# Patient Record
Sex: MALE | Race: WHITE | NOT HISPANIC OR LATINO | Employment: UNEMPLOYED | URBAN - METROPOLITAN AREA
[De-identification: names, ages, dates, MRNs, and addresses within clinical notes are randomized per-mention and may not be internally consistent; named-entity substitution may affect disease eponyms.]

---

## 2018-11-21 ENCOUNTER — LAB REQUISITION (OUTPATIENT)
Dept: LAB | Facility: HOSPITAL | Age: 38
End: 2018-11-21
Payer: COMMERCIAL

## 2018-11-21 DIAGNOSIS — Z79.899 OTHER LONG TERM (CURRENT) DRUG THERAPY: ICD-10-CM

## 2018-11-21 LAB
25(OH)D3 SERPL-MCNC: 11.2 NG/ML (ref 30–100)
ALBUMIN SERPL BCP-MCNC: 4.4 G/DL (ref 3.5–5)
ALP SERPL-CCNC: 80 U/L (ref 46–116)
ALT SERPL W P-5'-P-CCNC: 31 U/L (ref 12–78)
ANION GAP SERPL CALCULATED.3IONS-SCNC: 6 MMOL/L (ref 4–13)
AST SERPL W P-5'-P-CCNC: 13 U/L (ref 5–45)
BASOPHILS # BLD AUTO: 0.03 THOUSANDS/ΜL (ref 0–0.1)
BASOPHILS NFR BLD AUTO: 1 % (ref 0–1)
BILIRUB SERPL-MCNC: 0.58 MG/DL (ref 0.2–1)
BUN SERPL-MCNC: 14 MG/DL (ref 5–25)
CALCIUM SERPL-MCNC: 8.8 MG/DL (ref 8.3–10.1)
CHLORIDE SERPL-SCNC: 103 MMOL/L (ref 100–108)
CO2 SERPL-SCNC: 27 MMOL/L (ref 21–32)
CREAT SERPL-MCNC: 0.91 MG/DL (ref 0.6–1.3)
EOSINOPHIL # BLD AUTO: 0.04 THOUSAND/ΜL (ref 0–0.61)
EOSINOPHIL NFR BLD AUTO: 1 % (ref 0–6)
ERYTHROCYTE [DISTWIDTH] IN BLOOD BY AUTOMATED COUNT: 12.4 % (ref 11.6–15.1)
GFR SERPL CREATININE-BSD FRML MDRD: 81 ML/MIN/1.73SQ M
GLUCOSE SERPL-MCNC: 86 MG/DL (ref 65–140)
HCT VFR BLD AUTO: 49.6 % (ref 34.8–46.1)
HGB BLD-MCNC: 16.2 G/DL (ref 11.5–15.4)
IMM GRANULOCYTES # BLD AUTO: 0.04 THOUSAND/UL (ref 0–0.2)
IMM GRANULOCYTES NFR BLD AUTO: 1 % (ref 0–2)
LYMPHOCYTES # BLD AUTO: 1.55 THOUSANDS/ΜL (ref 0.6–4.47)
LYMPHOCYTES NFR BLD AUTO: 27 % (ref 14–44)
MCH RBC QN AUTO: 28.7 PG (ref 26.8–34.3)
MCHC RBC AUTO-ENTMCNC: 32.7 G/DL (ref 31.4–37.4)
MCV RBC AUTO: 88 FL (ref 82–98)
MONOCYTES # BLD AUTO: 0.5 THOUSAND/ΜL (ref 0.17–1.22)
MONOCYTES NFR BLD AUTO: 9 % (ref 4–12)
NEUTROPHILS # BLD AUTO: 3.61 THOUSANDS/ΜL (ref 1.85–7.62)
NEUTS SEG NFR BLD AUTO: 61 % (ref 43–75)
NRBC BLD AUTO-RTO: 0 /100 WBCS
PLATELET # BLD AUTO: 200 THOUSANDS/UL (ref 149–390)
PMV BLD AUTO: 9.5 FL (ref 8.9–12.7)
POTASSIUM SERPL-SCNC: 4.3 MMOL/L (ref 3.5–5.3)
PROT SERPL-MCNC: 7.5 G/DL (ref 6.4–8.2)
RBC # BLD AUTO: 5.65 MILLION/UL (ref 3.81–5.12)
SODIUM SERPL-SCNC: 136 MMOL/L (ref 136–145)
TSH SERPL DL<=0.05 MIU/L-ACNC: 2.57 UIU/ML (ref 0.36–3.74)
VALPROATE SERPL-MCNC: 68 UG/ML (ref 50–100)
WBC # BLD AUTO: 5.77 THOUSAND/UL (ref 4.31–10.16)

## 2018-11-21 PROCEDURE — 85025 COMPLETE CBC W/AUTO DIFF WBC: CPT | Performed by: PSYCHIATRY & NEUROLOGY

## 2018-11-21 PROCEDURE — 80164 ASSAY DIPROPYLACETIC ACD TOT: CPT | Performed by: PSYCHIATRY & NEUROLOGY

## 2018-11-21 PROCEDURE — 84443 ASSAY THYROID STIM HORMONE: CPT | Performed by: PSYCHIATRY & NEUROLOGY

## 2018-11-21 PROCEDURE — 80053 COMPREHEN METABOLIC PANEL: CPT | Performed by: PSYCHIATRY & NEUROLOGY

## 2018-11-21 PROCEDURE — 82306 VITAMIN D 25 HYDROXY: CPT | Performed by: PSYCHIATRY & NEUROLOGY

## 2019-01-15 ENCOUNTER — TRANSCRIBE ORDERS (OUTPATIENT)
Dept: LAB | Facility: CLINIC | Age: 39
End: 2019-01-15

## 2019-01-15 ENCOUNTER — APPOINTMENT (OUTPATIENT)
Dept: LAB | Facility: CLINIC | Age: 39
End: 2019-01-15
Payer: COMMERCIAL

## 2019-01-15 DIAGNOSIS — G40.802 CURSIVE EPILEPSY (HCC): ICD-10-CM

## 2019-01-15 DIAGNOSIS — Z00.00 ROUTINE GENERAL MEDICAL EXAMINATION AT A HEALTH CARE FACILITY: ICD-10-CM

## 2019-01-15 DIAGNOSIS — Z79.890 NEED FOR PROPHYLACTIC HORMONE REPLACEMENT THERAPY (POSTMENOPAUSAL): ICD-10-CM

## 2019-01-15 DIAGNOSIS — G40.802 CURSIVE EPILEPSY (HCC): Primary | ICD-10-CM

## 2019-01-15 LAB
ALBUMIN SERPL BCP-MCNC: 4 G/DL (ref 3.5–5)
ALP SERPL-CCNC: 72 U/L (ref 46–116)
ALT SERPL W P-5'-P-CCNC: 22 U/L (ref 12–78)
ANION GAP SERPL CALCULATED.3IONS-SCNC: 6 MMOL/L (ref 4–13)
AST SERPL W P-5'-P-CCNC: 10 U/L (ref 5–45)
BILIRUB SERPL-MCNC: 0.47 MG/DL (ref 0.2–1)
BUN SERPL-MCNC: 14 MG/DL (ref 5–25)
CALCIUM SERPL-MCNC: 8.9 MG/DL (ref 8.3–10.1)
CHLORIDE SERPL-SCNC: 103 MMOL/L (ref 100–108)
CHOLEST SERPL-MCNC: 155 MG/DL (ref 50–200)
CO2 SERPL-SCNC: 29 MMOL/L (ref 21–32)
CREAT SERPL-MCNC: 0.84 MG/DL (ref 0.6–1.3)
ERYTHROCYTE [DISTWIDTH] IN BLOOD BY AUTOMATED COUNT: 12 % (ref 11.6–15.1)
GFR SERPL CREATININE-BSD FRML MDRD: 112 ML/MIN/1.73SQ M
GLUCOSE P FAST SERPL-MCNC: 100 MG/DL (ref 65–99)
HCT VFR BLD AUTO: 45.3 % (ref 36.5–49.3)
HDLC SERPL-MCNC: 44 MG/DL (ref 40–60)
HGB BLD-MCNC: 15.2 G/DL (ref 12–17)
LDLC SERPL CALC-MCNC: 88 MG/DL (ref 0–100)
MCH RBC QN AUTO: 28.7 PG (ref 26.8–34.3)
MCHC RBC AUTO-ENTMCNC: 33.6 G/DL (ref 31.4–37.4)
MCV RBC AUTO: 86 FL (ref 82–98)
NONHDLC SERPL-MCNC: 111 MG/DL
PLATELET # BLD AUTO: 178 THOUSANDS/UL (ref 149–390)
PMV BLD AUTO: 9 FL (ref 8.9–12.7)
POTASSIUM SERPL-SCNC: 4.2 MMOL/L (ref 3.5–5.3)
PROT SERPL-MCNC: 7 G/DL (ref 6.4–8.2)
RBC # BLD AUTO: 5.29 MILLION/UL (ref 3.88–5.62)
SODIUM SERPL-SCNC: 138 MMOL/L (ref 136–145)
TRIGL SERPL-MCNC: 116 MG/DL
WBC # BLD AUTO: 4.55 THOUSAND/UL (ref 4.31–10.16)

## 2019-01-15 PROCEDURE — 80053 COMPREHEN METABOLIC PANEL: CPT

## 2019-01-15 PROCEDURE — 85027 COMPLETE CBC AUTOMATED: CPT

## 2019-01-15 PROCEDURE — 36415 COLL VENOUS BLD VENIPUNCTURE: CPT

## 2019-01-15 PROCEDURE — 80061 LIPID PANEL: CPT

## 2019-03-08 ENCOUNTER — APPOINTMENT (OUTPATIENT)
Dept: RADIOLOGY | Facility: CLINIC | Age: 39
End: 2019-03-08
Payer: COMMERCIAL

## 2019-03-08 ENCOUNTER — TRANSCRIBE ORDERS (OUTPATIENT)
Dept: URGENT CARE | Facility: CLINIC | Age: 39
End: 2019-03-08

## 2019-03-08 DIAGNOSIS — M54.5 LOW BACK PAIN, UNSPECIFIED BACK PAIN LATERALITY, UNSPECIFIED CHRONICITY, WITH SCIATICA PRESENCE UNSPECIFIED: Primary | ICD-10-CM

## 2019-03-08 PROCEDURE — 72110 X-RAY EXAM L-2 SPINE 4/>VWS: CPT

## 2019-08-26 ENCOUNTER — TRANSCRIBE ORDERS (OUTPATIENT)
Dept: ADMINISTRATIVE | Facility: HOSPITAL | Age: 39
End: 2019-08-26

## 2019-08-26 DIAGNOSIS — R56.9 SEIZURES (HCC): Primary | ICD-10-CM

## 2019-09-10 ENCOUNTER — HOSPITAL ENCOUNTER (OUTPATIENT)
Dept: NEUROLOGY | Facility: HOSPITAL | Age: 39
Discharge: HOME | End: 2019-09-10
Payer: COMMERCIAL

## 2019-09-10 DIAGNOSIS — R56.9 SEIZURES (HCC): ICD-10-CM

## 2019-09-10 PROCEDURE — 95816 EEG AWAKE AND DROWSY: CPT

## 2019-09-10 PROCEDURE — 95816 EEG AWAKE AND DROWSY: CPT | Performed by: PSYCHIATRY & NEUROLOGY

## 2019-09-30 ENCOUNTER — TRANSCRIBE ORDERS (OUTPATIENT)
Dept: LAB | Facility: CLINIC | Age: 39
End: 2019-09-30

## 2019-09-30 ENCOUNTER — APPOINTMENT (OUTPATIENT)
Dept: LAB | Facility: CLINIC | Age: 39
End: 2019-09-30
Payer: COMMERCIAL

## 2019-09-30 DIAGNOSIS — R56.9 SEIZURES (HCC): Primary | ICD-10-CM

## 2019-09-30 DIAGNOSIS — R56.9 SEIZURES (HCC): ICD-10-CM

## 2019-09-30 LAB
ALBUMIN SERPL BCP-MCNC: 4 G/DL (ref 3.5–5)
ALP SERPL-CCNC: 77 U/L (ref 46–116)
ALT SERPL W P-5'-P-CCNC: 23 U/L (ref 12–78)
ANION GAP SERPL CALCULATED.3IONS-SCNC: 7 MMOL/L (ref 4–13)
AST SERPL W P-5'-P-CCNC: 10 U/L (ref 5–45)
BILIRUB SERPL-MCNC: 0.63 MG/DL (ref 0.2–1)
BUN SERPL-MCNC: 12 MG/DL (ref 5–25)
CALCIUM SERPL-MCNC: 9 MG/DL (ref 8.3–10.1)
CHLORIDE SERPL-SCNC: 105 MMOL/L (ref 100–108)
CO2 SERPL-SCNC: 27 MMOL/L (ref 21–32)
CREAT SERPL-MCNC: 1 MG/DL (ref 0.6–1.3)
ERYTHROCYTE [DISTWIDTH] IN BLOOD BY AUTOMATED COUNT: 12 % (ref 11.6–15.1)
GFR SERPL CREATININE-BSD FRML MDRD: 95 ML/MIN/1.73SQ M
GLUCOSE P FAST SERPL-MCNC: 97 MG/DL (ref 65–99)
HCT VFR BLD AUTO: 45.6 % (ref 36.5–49.3)
HGB BLD-MCNC: 15.7 G/DL (ref 12–17)
MCH RBC QN AUTO: 29 PG (ref 26.8–34.3)
MCHC RBC AUTO-ENTMCNC: 34.4 G/DL (ref 31.4–37.4)
MCV RBC AUTO: 84 FL (ref 82–98)
PLATELET # BLD AUTO: 180 THOUSANDS/UL (ref 149–390)
PMV BLD AUTO: 9.4 FL (ref 8.9–12.7)
POTASSIUM SERPL-SCNC: 4.2 MMOL/L (ref 3.5–5.3)
PROT SERPL-MCNC: 7.2 G/DL (ref 6.4–8.2)
RBC # BLD AUTO: 5.41 MILLION/UL (ref 3.88–5.62)
SODIUM SERPL-SCNC: 139 MMOL/L (ref 136–145)
VALPROATE SERPL-MCNC: 72 UG/ML (ref 50–100)
WBC # BLD AUTO: 4.16 THOUSAND/UL (ref 4.31–10.16)

## 2019-09-30 PROCEDURE — 80164 ASSAY DIPROPYLACETIC ACD TOT: CPT

## 2019-09-30 PROCEDURE — 80053 COMPREHEN METABOLIC PANEL: CPT

## 2019-09-30 PROCEDURE — 36415 COLL VENOUS BLD VENIPUNCTURE: CPT

## 2019-09-30 PROCEDURE — 85027 COMPLETE CBC AUTOMATED: CPT

## 2019-10-03 ENCOUNTER — TRANSCRIBE ORDERS (OUTPATIENT)
Dept: ADMINISTRATIVE | Facility: HOSPITAL | Age: 39
End: 2019-10-03

## 2019-10-03 DIAGNOSIS — G89.29 CHRONIC INTRACTABLE HEADACHE, UNSPECIFIED HEADACHE TYPE: ICD-10-CM

## 2019-10-03 DIAGNOSIS — G40.309 GENERALIZED IDIOPATHIC EPILEPSY AND EPILEPTIC SYNDROMES, WITHOUT STATUS EPILEPTICUS, NOT INTRACTABLE (HCC): ICD-10-CM

## 2019-10-03 DIAGNOSIS — R51.9 CHRONIC INTRACTABLE HEADACHE, UNSPECIFIED HEADACHE TYPE: ICD-10-CM

## 2019-10-03 DIAGNOSIS — G43.009 MIGRAINE WITHOUT AURA AND WITHOUT STATUS MIGRAINOSUS, NOT INTRACTABLE: Primary | ICD-10-CM

## 2019-10-09 ENCOUNTER — HOSPITAL ENCOUNTER (OUTPATIENT)
Dept: MRI IMAGING | Facility: HOSPITAL | Age: 39
Discharge: HOME | End: 2019-10-09

## 2019-10-09 DIAGNOSIS — R51.9 CHRONIC INTRACTABLE HEADACHE, UNSPECIFIED HEADACHE TYPE: ICD-10-CM

## 2019-10-09 DIAGNOSIS — G89.29 CHRONIC INTRACTABLE HEADACHE, UNSPECIFIED HEADACHE TYPE: ICD-10-CM

## 2019-10-09 DIAGNOSIS — G40.309 GENERALIZED IDIOPATHIC EPILEPSY AND EPILEPTIC SYNDROMES, WITHOUT STATUS EPILEPTICUS, NOT INTRACTABLE (HCC): ICD-10-CM

## 2019-10-09 DIAGNOSIS — G43.009 MIGRAINE WITHOUT AURA AND WITHOUT STATUS MIGRAINOSUS, NOT INTRACTABLE: ICD-10-CM

## 2020-02-16 ENCOUNTER — OFFICE VISIT (OUTPATIENT)
Dept: URGENT CARE | Facility: CLINIC | Age: 40
End: 2020-02-16
Payer: COMMERCIAL

## 2020-02-16 VITALS
SYSTOLIC BLOOD PRESSURE: 138 MMHG | OXYGEN SATURATION: 99 % | RESPIRATION RATE: 18 BRPM | TEMPERATURE: 97.6 F | HEART RATE: 104 BPM | WEIGHT: 223 LBS | DIASTOLIC BLOOD PRESSURE: 82 MMHG | HEIGHT: 67 IN | BODY MASS INDEX: 35 KG/M2

## 2020-02-16 DIAGNOSIS — H61.21 IMPACTED CERUMEN OF RIGHT EAR: ICD-10-CM

## 2020-02-16 DIAGNOSIS — H61.22 EXCESSIVE CERUMEN IN EAR CANAL, LEFT: ICD-10-CM

## 2020-02-16 DIAGNOSIS — J02.9 SORE THROAT: ICD-10-CM

## 2020-02-16 DIAGNOSIS — J02.9 ACUTE VIRAL PHARYNGITIS: Primary | ICD-10-CM

## 2020-02-16 LAB — S PYO AG THROAT QL: NEGATIVE

## 2020-02-16 PROCEDURE — 69209 REMOVE IMPACTED EAR WAX UNI: CPT | Performed by: NURSE PRACTITIONER

## 2020-02-16 PROCEDURE — S9088 SERVICES PROVIDED IN URGENT: HCPCS | Performed by: NURSE PRACTITIONER

## 2020-02-16 PROCEDURE — 87070 CULTURE OTHR SPECIMN AEROBIC: CPT | Performed by: NURSE PRACTITIONER

## 2020-02-16 PROCEDURE — 87880 STREP A ASSAY W/OPTIC: CPT | Performed by: NURSE PRACTITIONER

## 2020-02-16 PROCEDURE — 99203 OFFICE O/P NEW LOW 30 MIN: CPT | Performed by: NURSE PRACTITIONER

## 2020-02-16 RX ORDER — DIVALPROEX SODIUM 500 MG/1
500 TABLET, EXTENDED RELEASE ORAL DAILY
COMMUNITY

## 2020-02-16 RX ORDER — DIVALPROEX SODIUM 250 MG/1
TABLET, EXTENDED RELEASE ORAL
COMMUNITY
Start: 2019-11-15

## 2020-02-17 NOTE — PROGRESS NOTES
[address] [facility]        NAME: Codi Hobbs is a 46 y o  male  : 1980    MRN: [id number]  DATE: 2020  TIME: 9:06 PM    Assessment and Plan   Acute viral pharyngitis [J02 8, B97 89]  1  Acute viral pharyngitis  Throat culture   2  Sore throat  POCT rapid strepA   3  Excessive cerumen in ear canal, left  Ear cerumen removal   4  Impacted cerumen of right ear  Ear cerumen removal     Ear cerumen removal  Date/Time: 2020 9:05 PM  Performed by: MAY Paul  Authorized by: Panchito Paul [facility]     Patient location:  Clinic  Consent:     Consent obtained:  Verbal    Consent given by:  Patient    Risks discussed:  Bleeding, dizziness, infection, incomplete removal, pain and TM perforation    Alternatives discussed:  Referral, observation, alternative treatment, delayed treatment and no treatment  Universal protocol:     Procedure explained and questions answered to patient or proxy's satisfaction: yes      Patient identity confirmed:  Verbally with patient  Procedure details:     Location:  L ear and R ear    Procedure type: irrigation only    Post-procedure details:     Complication:  None    Hearing quality:  Improved    Patient tolerance of procedure: Tolerated well, no immediate complications        Patient Instructions     Patient Instructions     Rapid strep was negative  We will send the other swab for culture and will call you if it is positive  Use over the counter medications to treat symptoms (ibuprofen, tylenol, throat lozenges)  Drinking warm tea with honey and/or gargling salt water will help soothe your throat  All of the excess ear wax was removed  Throat pain can sometimes cause ear discomfort too, but your ears are not infected  Since you tend to have wax build up, I recommend using generic Debrox occasionally  Pharyngitis   AMBULATORY CARE:   Pharyngitis , or sore throat, is inflammation of the tissues and structures in your pharynx (throat)  Pharyngitis is most often caused by bacteria  It may also be caused by a cold or flu virus  Other causes include smoking, allergies, or acid reflux  Signs and symptoms that may occur with pharyngitis:   · Sore throat or pain when you swallow    · Fever, chills, and body aches    · Hoarse or raspy voice    · Cough, runny or stuffy nose, itchy or watery eyes    · Headache    · Upset stomach and loss of appetite    · Mild neck stiffness    · Swollen glands that feel like hard lumps when you touch your neck    · White and yellow pus-filled blisters in the back of your throat  Call 911 for any of the following:   · You have trouble breathing or swallowing because your throat is swollen or sore  Seek care immediately if:   · You are drooling because it hurts too much to swallow  · Your fever is higher than 102? F (39?C) or lasts longer than 3 days  · You are confused  · You taste blood in your throat  Contact your healthcare provider if:   · Your throat pain gets worse  · You have a painful lump in your throat that does not go away after 5 days  · Your symptoms do not improve after 5 days  · You have questions or concerns about your condition or care  Treatment for pharyngitis:  Viral pharyngitis will go away on its own without treatment  Your sore throat should start to feel better in 3 to 5 days for both viral and bacterial infections  You may need any of the following:  · Antibiotics  treat a bacterial infection  · NSAIDs , such as ibuprofen, help decrease swelling, pain, and fever  NSAIDs can cause stomach bleeding or kidney problems in certain people  If you take blood thinner medicine, always ask your healthcare provider if NSAIDs are safe for you  Always read the medicine label and follow directions  · Acetaminophen  decreases pain and fever  It is available without a doctor's order  Ask how much to take and how often to take it  Follow directions   Acetaminophen can cause liver damage if not taken correctly  Manage your symptoms:   · Gargle salt water  Mix ¼ teaspoon salt in an 8 ounce glass of warm water and gargle  This may help decrease swelling in your throat  · Drink liquids as directed  You may need to drink more liquids than usual  Liquids may help soothe your throat and prevent dehydration  Ask how much liquid to drink each day and which liquids are best for you  · Use a cool-steam humidifier  to help moisten the air in your room and calm your cough  · Soothe your throat  with cough drops, ice, soft foods, or popsicles  Prevent the spread of pharyngitis:  Cover your mouth and nose when you cough or sneeze  Do not share food or drinks  Wash your hands often  Use soap and water  If soap and water are unavailable, use an alcohol based hand   Follow up with your healthcare provider as directed:  Write down your questions so you remember to ask them during your visits  © 2017 [address] [facility] Information is for End User's use only and may not be sold, redistributed or otherwise used for commercial purposes  All illustrations and images included in CareNotes® are the copyrighted property of A D A M [employer]  or Mahad Trejo  The above information is an  only  It is not intended as medical advice for individual conditions or treatments  Talk to your doctor, nurse or pharmacist before following any medical regimen to see if it is safe and effective for you  Cerumen Impaction   WHAT YOU NEED TO KNOW:   What is cerumen impaction? Cerumen impaction is the blockage of the outer ear canal by tightly packed cerumen (earwax)  What causes cerumen impaction? Anything that affects the normal outward flow of cerumen may cause impaction   The following factors may make it more likely for earwax to become impacted:  · Advanced age     · Conditions that produce too much cerumen, such as keratosis and other skin diseases    · Narrow or abnormally shaped ear canals    · Use of a hearing aid    · Incorrect use of cotton swabs, or using needles, hair pins, or other objects to clean the ears  What are the signs and symptoms of cerumen impaction? · Trouble hearing    · Dizziness    · Ear fullness or a feeling that something is plugging up your ear    · Itchiness or pain in the ears    · Ringing in the ears  How is cerumen impaction diagnosed? Your healthcare provider will ask about your medical history  This includes any ear problems or procedures you may have had  Your healthcare provider will carefully check your ears using a scope with a light  Your healthcare provider may look for other problems, such as bleeding, infection, or injury  Your eardrums will be checked for tears or holes  Your healthcare provider may also test your hearing  How is cerumen impaction treated? The goal of treatment is to remove the hardened wax  The type of treatment to be used may depend on your age, symptoms, or risk factors  Ask your healthcare provider which of the following treatments may be best for you:  · Ear drops:  Ear drops may be used to clear or soften the impacted earwax  This may be used alone or in combination with a procedure to take out the earwax  · Procedures:  When the impaction can be clearly seen, removal may be done using any of the following:    ¨ Irrigation:  Warm water from a syringe is used to wash the wax out of the ear canal  Irrigation may not be used on people with an eardrum tear, infection, or who have had ear surgery  ¨ Suction:  A small plastic tube that is connected to a machine is used to suck the impacted wax out of your ear  ¨ Instruments:  Your healthcare provider may use a curette (scoop-like instrument) or forceps to remove the impacted wax  What are the risks of having a cerumen impaction? · Procedures to remove the wax may cause bleeding and infection   The ear canal may be scraped and scratched or the eardrum may be injured, which may cause loss of hearing  · Untreated impacted cerumen may cause your symptoms to become worse  If it is not removed, impacted cerumen may cause an infection, irritation, loss of hearing, or further ear problems  When should I contact my healthcare provider? · You have a fever  · You have trouble hearing or hear ringing noises  · You have questions or concerns about your condition or care  When should I seek immediate care? · You feel dizzy  · You have discharge or blood coming out of your ear  · Your ear pain does not go away or gets worse  CARE AGREEMENT:   You have the right to help plan your care  Learn about your health condition and how it may be treated  Discuss treatment options with your caregivers to decide what care you want to receive  You always have the right to refuse treatment  The above information is an  only  It is not intended as medical advice for individual conditions or treatments  Talk to your doctor, nurse or pharmacist before following any medical regimen to see if it is safe and effective for you  © 2017 [address] [facility] Information is for End User's use only and may not be sold, redistributed or otherwise used for commercial purposes  All illustrations and images included in CareNotes® are the copyrighted property of A D A M [employer] Inc  or [employer]adalex Trejo  Follow up with PCP in 3-5 days  Proceed to  ER if symptoms worsen  Chief Complaint     Chief Complaint   Patient presents with    Sore Throat     for 1 day         History of Present Illness       Patient reports a sore throat since Friday night that he feels is getting worse not better  He also reports comfort in both ears and states that he thinks he may have excess wax since he has a history of wax buildup and eating have his ears flushed  He denies any congestion or postnasal drip  He presents to be seen    No known sick contacts      Review of Systems   Review of Systems   HENT: Positive for ear pain and sore throat  Negative for congestion and ear discharge  Respiratory: Negative  All other systems reviewed and are negative  Current Medications       Current Outpatient Medications:     divalproex sodium (DEPAKOTE ER) 250 mg 24 hr tablet, , Disp: , Rfl:     divalproex sodium (DEPAKOTE ER) 500 mg 24 hr tablet, Take 500 mg by mouth daily, Disp: , Rfl:     Current Allergies     Allergies as of 02/16/2020 - Reviewed 02/16/2020   Allergen Reaction Noted    Bactrim [sulfamethoxazole-trimethoprim]  02/16/2020    Dilantin [phenytoin]  02/16/2020    Erythromycin Other (See Comments) 12/30/2016    Hydromorphone Hives 12/30/2016    Penicillins Other (See Comments) 12/30/2016            The following portions of the patient's history were reviewed and updated as appropriate: allergies, current medications, past family history, past medical history, past social history, past surgical history and problem list      Past Medical History:   Diagnosis Date    Seizures ([facility] [place] [address] )        History reviewed  No pertinent surgical history  History reviewed  No pertinent family history  Medications have been verified  Objective   /82   Pulse 104   Temp 97 6 °F (36 4 °C) (Tympanic)   Resp 18   Ht 5' 7" (1 702 m)   Wt 101 kg (223 lb)   SpO2 99%   BMI 34 93 kg/m²        Physical Exam     Physical Exam   Constitutional: He is oriented to person, place, and time  He appears well-developed and well-nourished  Non-toxic appearance  No distress  HENT:   Head: Normocephalic and atraumatic  Right Ear: Hearing, tympanic membrane, external ear and ear canal normal  There is tenderness  Tympanic membrane is not erythematous and not bulging  No decreased hearing is noted  Left Ear: Hearing, tympanic membrane, external ear and ear canal normal  There is tenderness  Tympanic membrane is not erythematous and not bulging   No decreased hearing is noted  Nose: Nose normal  No mucosal edema or rhinorrhea  Right sinus exhibits no maxillary sinus tenderness and no frontal sinus tenderness  Left sinus exhibits no maxillary sinus tenderness and no frontal sinus tenderness  Mouth/Throat: Uvula is midline and mucous membranes are normal  Posterior oropharyngeal erythema (mild) present  No oropharyngeal exudate or posterior oropharyngeal edema  Tonsils are 0 on the right  Tonsils are 0 on the left  Initially there was impacted cerumen in right canal obstructing any view of the tympanic membrane  After removal, ear is within normal limits  There was excess cerumen in the canal of left ear but not complete impaction  Patient reported discomfort with this and requested that the ear be flushed as well  After removal, ear still appears within normal limits  Eyes: Pupils are equal, round, and reactive to light  Neck: Normal range of motion  Neck supple  Cardiovascular: Normal rate, regular rhythm and normal heart sounds  Exam reveals no gallop and no friction rub  No murmur heard  Pulmonary/Chest: Effort normal and breath sounds normal  No accessory muscle usage or stridor  No apnea, no tachypnea and no bradypnea  No respiratory distress  He has no decreased breath sounds  He has no wheezes  He has no rhonchi  He has no rales  He exhibits no tenderness  Abdominal: Soft  Bowel sounds are normal  He exhibits no distension  There is no tenderness  Musculoskeletal: Normal range of motion  He exhibits no edema  Lymphadenopathy:     He has no cervical adenopathy  Neurological: He is alert and oriented to person, place, and time  Skin: Skin is warm and dry  Capillary refill takes less than 2 seconds  He is not diaphoretic  Psychiatric: He has a normal mood and affect  His behavior is normal  Judgment and thought content normal    Nursing note and vitals reviewed

## 2020-02-17 NOTE — PATIENT INSTRUCTIONS
Rapid strep was negative  We will send the other swab for culture and will call you if it is positive  Use over the counter medications to treat symptoms (ibuprofen, tylenol, throat lozenges)  Drinking warm tea with honey and/or gargling salt water will help soothe your throat  All of the excess ear wax was removed  Throat pain can sometimes cause ear discomfort too, but your ears are not infected  Since you tend to have wax build up, I recommend using generic Debrox occasionally  Pharyngitis   AMBULATORY CARE:   Pharyngitis , or sore throat, is inflammation of the tissues and structures in your pharynx (throat)  Pharyngitis is most often caused by bacteria  It may also be caused by a cold or flu virus  Other causes include smoking, allergies, or acid reflux  Signs and symptoms that may occur with pharyngitis:   · Sore throat or pain when you swallow    · Fever, chills, and body aches    · Hoarse or raspy voice    · Cough, runny or stuffy nose, itchy or watery eyes    · Headache    · Upset stomach and loss of appetite    · Mild neck stiffness    · Swollen glands that feel like hard lumps when you touch your neck    · White and yellow pus-filled blisters in the back of your throat  Call 911 for any of the following:   · You have trouble breathing or swallowing because your throat is swollen or sore  Seek care immediately if:   · You are drooling because it hurts too much to swallow  · Your fever is higher than 102? F (39?C) or lasts longer than 3 days  · You are confused  · You taste blood in your throat  Contact your healthcare provider if:   · Your throat pain gets worse  · You have a painful lump in your throat that does not go away after 5 days  · Your symptoms do not improve after 5 days  · You have questions or concerns about your condition or care  Treatment for pharyngitis:  Viral pharyngitis will go away on its own without treatment   Your sore throat should start to feel better in 3 to 5 days for both viral and bacterial infections  You may need any of the following:  · Antibiotics  treat a bacterial infection  · NSAIDs , such as ibuprofen, help decrease swelling, pain, and fever  NSAIDs can cause stomach bleeding or kidney problems in certain people  If you take blood thinner medicine, always ask your healthcare provider if NSAIDs are safe for you  Always read the medicine label and follow directions  · Acetaminophen  decreases pain and fever  It is available without a doctor's order  Ask how much to take and how often to take it  Follow directions  Acetaminophen can cause liver damage if not taken correctly  Manage your symptoms:   · Gargle salt water  Mix ¼ teaspoon salt in an 8 ounce glass of warm water and gargle  This may help decrease swelling in your throat  · Drink liquids as directed  You may need to drink more liquids than usual  Liquids may help soothe your throat and prevent dehydration  Ask how much liquid to drink each day and which liquids are best for you  · Use a cool-steam humidifier  to help moisten the air in your room and calm your cough  · Soothe your throat  with cough drops, ice, soft foods, or popsicles  Prevent the spread of pharyngitis:  Cover your mouth and nose when you cough or sneeze  Do not share food or drinks  Wash your hands often  Use soap and water  If soap and water are unavailable, use an alcohol based hand   Follow up with your healthcare provider as directed:  Write down your questions so you remember to ask them during your visits  © 2017 [address] [employer] Information is for End User's use only and may not be sold, redistributed or otherwise used for commercial purposes  All illustrations and images included in CareNotes® are the copyrighted property of [employer]  or Mahad Trejo  The above information is an  only   It is not intended as medical advice for individual conditions or treatments  Talk to your doctor, nurse or pharmacist before following any medical regimen to see if it is safe and effective for you  Cerumen Impaction   WHAT YOU NEED TO KNOW:   What is cerumen impaction? Cerumen impaction is the blockage of the outer ear canal by tightly packed cerumen (earwax)  What causes cerumen impaction? Anything that affects the normal outward flow of cerumen may cause impaction  The following factors may make it more likely for earwax to become impacted:  · Advanced age     · Conditions that produce too much cerumen, such as keratosis and other skin diseases    · Narrow or abnormally shaped ear canals    · Use of a hearing aid    · Incorrect use of cotton swabs, or using needles, hair pins, or other objects to clean the ears  What are the signs and symptoms of cerumen impaction? · Trouble hearing    · Dizziness    · Ear fullness or a feeling that something is plugging up your ear    · Itchiness or pain in the ears    · Ringing in the ears  How is cerumen impaction diagnosed? Your healthcare provider will ask about your medical history  This includes any ear problems or procedures you may have had  Your healthcare provider will carefully check your ears using a scope with a light  Your healthcare provider may look for other problems, such as bleeding, infection, or injury  Your eardrums will be checked for tears or holes  Your healthcare provider may also test your hearing  How is cerumen impaction treated? The goal of treatment is to remove the hardened wax  The type of treatment to be used may depend on your age, symptoms, or risk factors  Ask your healthcare provider which of the following treatments may be best for you:  · Ear drops:  Ear drops may be used to clear or soften the impacted earwax  This may be used alone or in combination with a procedure to take out the earwax      · Procedures:  When the impaction can be clearly seen, removal may be done using any of the following:    ¨ Irrigation:  Warm water from a syringe is used to wash the wax out of the ear canal  Irrigation may not be used on people with an eardrum tear, infection, or who have had ear surgery  ¨ Suction:  A small plastic tube that is connected to a machine is used to suck the impacted wax out of your ear  ¨ Instruments:  Your healthcare provider may use a curette (scoop-like instrument) or forceps to remove the impacted wax  What are the risks of having a cerumen impaction? · Procedures to remove the wax may cause bleeding and infection  The ear canal may be scraped and scratched or the eardrum may be injured, which may cause loss of hearing  · Untreated impacted cerumen may cause your symptoms to become worse  If it is not removed, impacted cerumen may cause an infection, irritation, loss of hearing, or further ear problems  When should I contact my healthcare provider? · You have a fever  · You have trouble hearing or hear ringing noises  · You have questions or concerns about your condition or care  When should I seek immediate care? · You feel dizzy  · You have discharge or blood coming out of your ear  · Your ear pain does not go away or gets worse  CARE AGREEMENT:   You have the right to help plan your care  Learn about your health condition and how it may be treated  Discuss treatment options with your caregivers to decide what care you want to receive  You always have the right to refuse treatment  The above information is an  only  It is not intended as medical advice for individual conditions or treatments  Talk to your doctor, nurse or pharmacist before following any medical regimen to see if it is safe and effective for you  © 2017 [address] [employer] Information is for End User's use only and may not be sold, redistributed or otherwise used for commercial purposes   All illustrations and images included in CareNotes® are the copyrighted property of [employer]  or Mahad Trejo

## 2020-02-19 LAB — BACTERIA THROAT CULT: NORMAL

## 2020-06-19 ENCOUNTER — TRANSCRIBE ORDERS (OUTPATIENT)
Dept: ADMINISTRATIVE | Facility: HOSPITAL | Age: 40
End: 2020-06-19

## 2020-06-19 ENCOUNTER — APPOINTMENT (OUTPATIENT)
Dept: LAB | Facility: HOSPITAL | Age: 40
End: 2020-06-19
Payer: COMMERCIAL

## 2020-06-19 DIAGNOSIS — R56.9 SEIZURE (HCC): Primary | ICD-10-CM

## 2020-06-19 DIAGNOSIS — E78.5 HYPERLIPIDEMIA, UNSPECIFIED HYPERLIPIDEMIA TYPE: ICD-10-CM

## 2020-06-19 DIAGNOSIS — I10 ESSENTIAL HYPERTENSION, MALIGNANT: ICD-10-CM

## 2020-06-19 DIAGNOSIS — R73.09 OTHER ABNORMAL GLUCOSE: ICD-10-CM

## 2020-06-19 DIAGNOSIS — E55.9 VITAMIN D DEFICIENCY: ICD-10-CM

## 2020-06-19 DIAGNOSIS — R56.9 SEIZURE (HCC): ICD-10-CM

## 2020-06-19 DIAGNOSIS — E78.5 HYPERLIPIDEMIA, UNSPECIFIED HYPERLIPIDEMIA TYPE: Primary | ICD-10-CM

## 2020-06-19 LAB
25(OH)D3 SERPL-MCNC: 23.4 NG/ML (ref 30–100)
ALBUMIN SERPL BCP-MCNC: 4.3 G/DL (ref 3.5–5)
ALP SERPL-CCNC: 81 U/L (ref 46–116)
ALT SERPL W P-5'-P-CCNC: 32 U/L (ref 12–78)
ANION GAP SERPL CALCULATED.3IONS-SCNC: 6 MMOL/L (ref 4–13)
AST SERPL W P-5'-P-CCNC: 14 U/L (ref 5–45)
BILIRUB SERPL-MCNC: 0.6 MG/DL (ref 0.2–1)
BUN SERPL-MCNC: 12 MG/DL (ref 5–25)
CALCIUM SERPL-MCNC: 8.7 MG/DL (ref 8.3–10.1)
CHLORIDE SERPL-SCNC: 104 MMOL/L (ref 100–108)
CHOLEST SERPL-MCNC: 159 MG/DL (ref 50–200)
CO2 SERPL-SCNC: 29 MMOL/L (ref 21–32)
CREAT SERPL-MCNC: 0.82 MG/DL (ref 0.6–1.3)
ERYTHROCYTE [DISTWIDTH] IN BLOOD BY AUTOMATED COUNT: 11.9 % (ref 11.6–15.1)
EST. AVERAGE GLUCOSE BLD GHB EST-MCNC: 97 MG/DL
GFR SERPL CREATININE-BSD FRML MDRD: 111 ML/MIN/1.73SQ M
GLUCOSE P FAST SERPL-MCNC: 96 MG/DL (ref 65–99)
HBA1C MFR BLD: 5 %
HCT VFR BLD AUTO: 45.8 % (ref 36.5–49.3)
HDLC SERPL-MCNC: 40 MG/DL
HGB BLD-MCNC: 15.7 G/DL (ref 12–17)
LDLC SERPL CALC-MCNC: 102 MG/DL (ref 0–100)
MCH RBC QN AUTO: 29.5 PG (ref 26.8–34.3)
MCHC RBC AUTO-ENTMCNC: 34.3 G/DL (ref 31.4–37.4)
MCV RBC AUTO: 86 FL (ref 82–98)
NONHDLC SERPL-MCNC: 119 MG/DL
PLATELET # BLD AUTO: 176 THOUSANDS/UL (ref 149–390)
PMV BLD AUTO: 8.9 FL (ref 8.9–12.7)
POTASSIUM SERPL-SCNC: 4.4 MMOL/L (ref 3.5–5.3)
PROT SERPL-MCNC: 7.6 G/DL (ref 6.4–8.2)
RBC # BLD AUTO: 5.32 MILLION/UL (ref 3.88–5.62)
SODIUM SERPL-SCNC: 139 MMOL/L (ref 136–145)
TRIGL SERPL-MCNC: 83 MG/DL
VALPROATE SERPL-MCNC: 51 UG/ML (ref 50–100)
WBC # BLD AUTO: 5.58 THOUSAND/UL (ref 4.31–10.16)

## 2020-06-19 PROCEDURE — 80164 ASSAY DIPROPYLACETIC ACD TOT: CPT

## 2020-06-19 PROCEDURE — 80061 LIPID PANEL: CPT

## 2020-06-19 PROCEDURE — 80053 COMPREHEN METABOLIC PANEL: CPT

## 2020-06-19 PROCEDURE — 36415 COLL VENOUS BLD VENIPUNCTURE: CPT

## 2020-06-19 PROCEDURE — 83036 HEMOGLOBIN GLYCOSYLATED A1C: CPT

## 2020-06-19 PROCEDURE — 82306 VITAMIN D 25 HYDROXY: CPT

## 2020-06-19 PROCEDURE — 85027 COMPLETE CBC AUTOMATED: CPT

## 2020-12-30 ENCOUNTER — LAB (OUTPATIENT)
Dept: LAB | Facility: HOSPITAL | Age: 40
End: 2020-12-30
Payer: COMMERCIAL

## 2020-12-30 ENCOUNTER — TRANSCRIBE ORDERS (OUTPATIENT)
Dept: ADMINISTRATIVE | Facility: HOSPITAL | Age: 40
End: 2020-12-30

## 2020-12-30 DIAGNOSIS — R56.9 SEIZURES (HCC): Primary | ICD-10-CM

## 2020-12-30 DIAGNOSIS — R56.9 SEIZURES (HCC): ICD-10-CM

## 2020-12-30 LAB
ALBUMIN SERPL BCP-MCNC: 4 G/DL (ref 3.5–5)
ALP SERPL-CCNC: 80 U/L (ref 46–116)
ALT SERPL W P-5'-P-CCNC: 27 U/L (ref 12–78)
ANION GAP SERPL CALCULATED.3IONS-SCNC: 6 MMOL/L (ref 4–13)
AST SERPL W P-5'-P-CCNC: 15 U/L (ref 5–45)
BILIRUB SERPL-MCNC: 0.6 MG/DL (ref 0.2–1)
BUN SERPL-MCNC: 11 MG/DL (ref 5–25)
CALCIUM SERPL-MCNC: 8.7 MG/DL (ref 8.3–10.1)
CHLORIDE SERPL-SCNC: 104 MMOL/L (ref 100–108)
CO2 SERPL-SCNC: 29 MMOL/L (ref 21–32)
CREAT SERPL-MCNC: 0.89 MG/DL (ref 0.6–1.3)
ERYTHROCYTE [DISTWIDTH] IN BLOOD BY AUTOMATED COUNT: 11.6 % (ref 11.6–15.1)
GFR SERPL CREATININE-BSD FRML MDRD: 108 ML/MIN/1.73SQ M
GLUCOSE SERPL-MCNC: 115 MG/DL (ref 65–140)
HCT VFR BLD AUTO: 44 % (ref 36.5–49.3)
HGB BLD-MCNC: 14.7 G/DL (ref 12–17)
MCH RBC QN AUTO: 28.7 PG (ref 26.8–34.3)
MCHC RBC AUTO-ENTMCNC: 33.4 G/DL (ref 31.4–37.4)
MCV RBC AUTO: 86 FL (ref 82–98)
PLATELET # BLD AUTO: 181 THOUSANDS/UL (ref 149–390)
PMV BLD AUTO: 9 FL (ref 8.9–12.7)
POTASSIUM SERPL-SCNC: 4.1 MMOL/L (ref 3.5–5.3)
PROT SERPL-MCNC: 6.9 G/DL (ref 6.4–8.2)
RBC # BLD AUTO: 5.13 MILLION/UL (ref 3.88–5.62)
SODIUM SERPL-SCNC: 139 MMOL/L (ref 136–145)
WBC # BLD AUTO: 5.2 THOUSAND/UL (ref 4.31–10.16)

## 2020-12-30 PROCEDURE — 36415 COLL VENOUS BLD VENIPUNCTURE: CPT

## 2020-12-30 PROCEDURE — 85027 COMPLETE CBC AUTOMATED: CPT

## 2020-12-30 PROCEDURE — 80053 COMPREHEN METABOLIC PANEL: CPT

## 2022-09-13 ENCOUNTER — APPOINTMENT (OUTPATIENT)
Dept: LAB | Facility: HOSPITAL | Age: 42
End: 2022-09-13

## 2022-09-13 DIAGNOSIS — R63.4 LOSS OF WEIGHT: ICD-10-CM

## 2022-09-13 DIAGNOSIS — R53.83 FATIGUE, UNSPECIFIED TYPE: ICD-10-CM

## 2022-09-13 LAB
ALBUMIN SERPL BCP-MCNC: 4.4 G/DL (ref 3.5–5)
ALP SERPL-CCNC: 74 U/L (ref 46–116)
ALT SERPL W P-5'-P-CCNC: 24 U/L (ref 12–78)
ANION GAP SERPL CALCULATED.3IONS-SCNC: 6 MMOL/L (ref 4–13)
AST SERPL W P-5'-P-CCNC: 24 U/L (ref 5–45)
BASOPHILS # BLD AUTO: 0.02 THOUSANDS/ΜL (ref 0–0.1)
BASOPHILS NFR BLD AUTO: 0 % (ref 0–1)
BILIRUB SERPL-MCNC: 0.79 MG/DL (ref 0.2–1)
BUN SERPL-MCNC: 10 MG/DL (ref 5–25)
CALCIUM SERPL-MCNC: 9.1 MG/DL (ref 8.3–10.1)
CHLORIDE SERPL-SCNC: 102 MMOL/L (ref 96–108)
CO2 SERPL-SCNC: 29 MMOL/L (ref 21–32)
CREAT SERPL-MCNC: 0.93 MG/DL (ref 0.6–1.3)
EOSINOPHIL # BLD AUTO: 0.01 THOUSAND/ΜL (ref 0–0.61)
EOSINOPHIL NFR BLD AUTO: 0 % (ref 0–6)
ERYTHROCYTE [DISTWIDTH] IN BLOOD BY AUTOMATED COUNT: 11.8 % (ref 11.6–15.1)
GFR SERPL CREATININE-BSD FRML MDRD: 101 ML/MIN/1.73SQ M
GLUCOSE P FAST SERPL-MCNC: 102 MG/DL (ref 65–99)
HCT VFR BLD AUTO: 48.2 % (ref 36.5–49.3)
HGB BLD-MCNC: 16.4 G/DL (ref 12–17)
IMM GRANULOCYTES # BLD AUTO: 0.01 THOUSAND/UL (ref 0–0.2)
IMM GRANULOCYTES NFR BLD AUTO: 0 % (ref 0–2)
LYMPHOCYTES # BLD AUTO: 1.25 THOUSANDS/ΜL (ref 0.6–4.47)
LYMPHOCYTES NFR BLD AUTO: 22 % (ref 14–44)
MCH RBC QN AUTO: 28.6 PG (ref 26.8–34.3)
MCHC RBC AUTO-ENTMCNC: 34 G/DL (ref 31.4–37.4)
MCV RBC AUTO: 84 FL (ref 82–98)
MONOCYTES # BLD AUTO: 0.41 THOUSAND/ΜL (ref 0.17–1.22)
MONOCYTES NFR BLD AUTO: 7 % (ref 4–12)
NEUTROPHILS # BLD AUTO: 4.05 THOUSANDS/ΜL (ref 1.85–7.62)
NEUTS SEG NFR BLD AUTO: 71 % (ref 43–75)
NRBC BLD AUTO-RTO: 0 /100 WBCS
PLATELET # BLD AUTO: 165 THOUSANDS/UL (ref 149–390)
PMV BLD AUTO: 8.8 FL (ref 8.9–12.7)
POTASSIUM SERPL-SCNC: 4.4 MMOL/L (ref 3.5–5.3)
PROT SERPL-MCNC: 7.7 G/DL (ref 6.4–8.4)
RBC # BLD AUTO: 5.73 MILLION/UL (ref 3.88–5.62)
SODIUM SERPL-SCNC: 137 MMOL/L (ref 135–147)
TSH SERPL DL<=0.05 MIU/L-ACNC: 1.03 UIU/ML (ref 0.45–4.5)
WBC # BLD AUTO: 5.75 THOUSAND/UL (ref 4.31–10.16)

## 2022-09-13 PROCEDURE — 80053 COMPREHEN METABOLIC PANEL: CPT

## 2022-09-13 PROCEDURE — 36415 COLL VENOUS BLD VENIPUNCTURE: CPT

## 2022-09-13 PROCEDURE — 84443 ASSAY THYROID STIM HORMONE: CPT

## 2022-09-13 PROCEDURE — 83036 HEMOGLOBIN GLYCOSYLATED A1C: CPT

## 2022-09-13 PROCEDURE — 85025 COMPLETE CBC W/AUTO DIFF WBC: CPT

## 2022-09-14 LAB
EST. AVERAGE GLUCOSE BLD GHB EST-MCNC: 97 MG/DL
HBA1C MFR BLD: 5 %

## 2022-10-03 ENCOUNTER — APPOINTMENT (OUTPATIENT)
Dept: LAB | Facility: HOSPITAL | Age: 42
End: 2022-10-03
Payer: COMMERCIAL

## 2022-10-03 DIAGNOSIS — G40.909 NONINTRACTABLE EPILEPSY WITHOUT STATUS EPILEPTICUS, UNSPECIFIED EPILEPSY TYPE (HCC): ICD-10-CM

## 2022-10-03 DIAGNOSIS — E78.5 HYPERLIPIDEMIA, UNSPECIFIED HYPERLIPIDEMIA TYPE: ICD-10-CM

## 2022-10-03 LAB
CHOLEST SERPL-MCNC: 166 MG/DL
HDLC SERPL-MCNC: 51 MG/DL
LDLC SERPL CALC-MCNC: 101 MG/DL (ref 0–100)
NONHDLC SERPL-MCNC: 115 MG/DL
TRIGL SERPL-MCNC: 72 MG/DL
VALPROATE SERPL-MCNC: 35 UG/ML (ref 50–100)

## 2022-10-03 PROCEDURE — 80061 LIPID PANEL: CPT

## 2022-10-03 PROCEDURE — 80164 ASSAY DIPROPYLACETIC ACD TOT: CPT

## 2022-10-03 PROCEDURE — 36415 COLL VENOUS BLD VENIPUNCTURE: CPT

## 2022-11-11 ENCOUNTER — TELEPHONE (OUTPATIENT)
Dept: UROLOGY | Facility: AMBULATORY SURGERY CENTER | Age: 42
End: 2022-11-11

## 2022-11-11 NOTE — TELEPHONE ENCOUNTER
What is the reason for the patient’s appointment? NP- ED, premature ejaculation  Might be Peryone's but he's not sure  Patient can be reached at [phone]     What office location does the patient prefer? [place]    Imaging/Lab Results: in Epic    Do we accept the patient's insurance or is the patient Self-Pay? [employer]     Has the patient had any previous Urologist(s)? No    Have patient records been requested? No    Has the patient had any outside testing done? No    Does the patient have a personal history of cancer?   No

## 2022-12-18 ENCOUNTER — OFFICE VISIT (OUTPATIENT)
Dept: URGENT CARE | Facility: MEDICAL CENTER | Age: 42
End: 2022-12-18

## 2022-12-18 VITALS
DIASTOLIC BLOOD PRESSURE: 80 MMHG | RESPIRATION RATE: 20 BRPM | WEIGHT: 188.6 LBS | BODY MASS INDEX: 29.54 KG/M2 | SYSTOLIC BLOOD PRESSURE: 122 MMHG | OXYGEN SATURATION: 98 % | HEART RATE: 100 BPM | TEMPERATURE: 98.9 F

## 2022-12-18 DIAGNOSIS — R05.1 ACUTE COUGH: ICD-10-CM

## 2022-12-18 DIAGNOSIS — K12.2 UVULITIS: Primary | ICD-10-CM

## 2022-12-18 DIAGNOSIS — H65.93 BILATERAL OTITIS MEDIA WITH EFFUSION: ICD-10-CM

## 2022-12-18 RX ORDER — AZITHROMYCIN 250 MG/1
TABLET, FILM COATED ORAL
Qty: 6 TABLET | Refills: 0 | Status: SHIPPED | OUTPATIENT
Start: 2022-12-18 | End: 2022-12-22

## 2022-12-18 NOTE — PATIENT INSTRUCTIONS
Start Zithromax  Tylenol or Motrin as needed for fever or pain  If symptoms worsen have yourself rechecked

## 2022-12-18 NOTE — PROGRESS NOTES
[facility]        NAME: Dara Dominguez is a 40 y o  male  : 1980    MRN: [id number]  DATE: 2022  TIME: 4:42 PM    Assessment and Plan   Uvulitis [K12 2]  1  Uvulitis  azithromycin (ZITHROMAX) 250 mg tablet      2  Bilateral otitis media with effusion        3  Acute cough  Cov/Flu-Collected at [facility]            Patient Instructions       Follow up with PCP in 3-5 days  Proceed to  ER if symptoms worsen  Chief Complaint     Chief Complaint   Patient presents with   • Cold Like Symptoms     Sore throat, dizziness, congestion, ear aches  Had flu vaccine Wednesday  Using Tylenol  History of Present Illness       Patient presents with a four-day history of low-grade fever which is resolved ear pain sore throat dizziness cough body aches  Had a flu shot today he started not feeling well  No nausea vomiting diarrhea  Review of Systems   Review of Systems   Constitutional: Positive for fever (low grade)  HENT: Positive for ear pain and sore throat  Respiratory: Positive for cough  Gastrointestinal: Negative for diarrhea, nausea and vomiting  Musculoskeletal: Positive for myalgias  Neurological: Positive for dizziness  Negative for headaches           Current Medications       Current Outpatient Medications:   •  azithromycin (ZITHROMAX) 250 mg tablet, Take 2 tablets today then 1 tablet daily x 4 days, Disp: 6 tablet, Rfl: 0  •  divalproex sodium (DEPAKOTE ER) 250 mg 24 hr tablet, , Disp: , Rfl:   •  divalproex sodium (DEPAKOTE ER) 500 mg 24 hr tablet, Take 500 mg by mouth daily, Disp: , Rfl:     Current Allergies     Allergies as of 2022 - Reviewed 2022   Allergen Reaction Noted   • Bactrim [sulfamethoxazole-trimethoprim]  2020   • Dilantin [phenytoin]  2020   • Erythromycin Other (See Comments) 2016   • Hydromorphone Hives 2016   • Penicillins Other (See Comments) 2016            The following portions of the patient's history were reviewed and updated as appropriate: allergies, current medications, past family history, past medical history, past social history, past surgical history and problem list      Past Medical History:   Diagnosis Date   • Seizures ([address] )        No past surgical history on file  No family history on file  Medications have been verified  Objective   /80   Pulse 100   Temp 98 9 °F (37 2 °C)   Resp 20   Wt 85 5 kg (188 lb 9 6 oz)   SpO2 98%   BMI 29 54 kg/m²   No LMP for male patient  Physical Exam     Physical Exam  Vitals and nursing note reviewed  Constitutional:       Appearance: Normal appearance  HENT:      Head: Normocephalic and atraumatic  Ears:      Comments: Cm without erythema there are diminished light reflex is bilaterally  Mouth/Throat:      Mouth: Mucous membranes are moist       Comments: Erythematous an elongated uvula airway patent  Eyes:      Conjunctiva/sclera: Conjunctivae normal    Cardiovascular:      Rate and Rhythm: Normal rate and regular rhythm  Heart sounds: Normal heart sounds  Pulmonary:      Effort: Pulmonary effort is normal       Breath sounds: Normal breath sounds  Neurological:      Mental Status: He is alert

## 2022-12-19 ENCOUNTER — TELEMEDICINE (OUTPATIENT)
Dept: UROLOGY | Facility: MEDICAL CENTER | Age: 42
End: 2022-12-19

## 2022-12-19 DIAGNOSIS — N48.6 PEYRONIE DISEASE: Primary | ICD-10-CM

## 2022-12-19 DIAGNOSIS — N52.9 ERECTILE DYSFUNCTION, UNSPECIFIED ERECTILE DYSFUNCTION TYPE: ICD-10-CM

## 2022-12-19 LAB
FLUAV RNA RESP QL NAA+PROBE: NEGATIVE
FLUBV RNA RESP QL NAA+PROBE: NEGATIVE
SARS-COV-2 RNA RESP QL NAA+PROBE: POSITIVE

## 2022-12-19 NOTE — PROGRESS NOTES
Virtual Regular Visit  It was my intent to perform this visit via video technology but the patient was not able to do a video connection so the visit was completed via audio telephone only  Verification of patient location:    Patient is located in the following state in which I hold an active license [place]      Assessment/Plan:    Problem List Items Addressed This Visit    None  Visit Diagnoses     Peyronie disease    -  Primary    Erectile dysfunction, unspecified erectile dysfunction type                   Reason for visit is No chief complaint on file  Encounter provider Rosario Lechuga MD    Provider located at [address] [place] [id number]      Recent Visits  No visits were found meeting these conditions  Showing recent visits within past 7 days and meeting all other requirements  Future Appointments  No visits were found meeting these conditions  Showing future appointments within next 150 days and meeting all other requirements       The patient was identified by name and date of birth  Miguel Ángel Phillip was informed that this is a telemedicine visit and that the visit is being conducted through Telephone  My office door was closed  No one else was in the room  He acknowledged consent and understanding of privacy and security of the video platform  The patient has agreed to participate and understands they can discontinue the visit at any time  Patient is aware this is a billable service  Subjective  Miguel Ángel Phillip is a 40 y o  male who is very nervous and anxious during history taking  The patient notes that approximately 2 years ago he had some sort of injury that resulted in a slight penile curvature and indentation which sounds like potential Peyronie's disease  The patient also notes some erectile dysfunction with decreased erectile rigidity early loss of erection and premature ejaculation    The patient I had a long discussion concerning these conditions and he will contact the office so that he can be examined after induction of an artificial erection with alprostadil  Naoma Mood HPI     Past Medical History:   Diagnosis Date   • Seizures ([facility] [address] )        History reviewed  No pertinent surgical history  Current Outpatient Medications   Medication Sig Dispense Refill   • azithromycin (ZITHROMAX) 250 mg tablet Take 2 tablets today then 1 tablet daily x 4 days 6 tablet 0   • divalproex sodium (DEPAKOTE ER) 250 mg 24 hr tablet      • divalproex sodium (DEPAKOTE ER) 500 mg 24 hr tablet Take 500 mg by mouth daily       No current facility-administered medications for this visit  Allergies   Allergen Reactions   • Bactrim [Sulfamethoxazole-Trimethoprim]    • Dilantin [Phenytoin]    • Erythromycin Other (See Comments)   • Hydromorphone Hives   • Penicillins Other (See Comments)       Review of Systems   Constitutional: Positive for fatigue and fever  Respiratory: Positive for shortness of breath and wheezing  COVID-positive-patient told to contact PCP for COVID treatment   Musculoskeletal: Positive for myalgias  Video Exam    There were no vitals filed for this visit  Physical Exam  Vitals reviewed  Constitutional:       General: He is not in acute distress    Pulmonary:      Effort: Pulmonary effort is normal           I spent 15 minutes directly with the patient during this visit

## 2022-12-21 ENCOUNTER — TELEPHONE (OUTPATIENT)
Dept: URGENT CARE | Facility: MEDICAL CENTER | Age: 42
End: 2022-12-21

## 2022-12-21 NOTE — TELEPHONE ENCOUNTER
Spoke with patient he is aware he tested positive for COVID  Advised to quarantine through Wednesday December 21,2022  He is starting to feel better no longer has fevers  Advised if any symptoms worsen he should have himself rechecked  Patient verbalized his understanding

## 2023-01-04 ENCOUNTER — OFFICE VISIT (OUTPATIENT)
Dept: URGENT CARE | Facility: MEDICAL CENTER | Age: 43
End: 2023-01-04

## 2023-01-04 VITALS
HEART RATE: 94 BPM | WEIGHT: 188.49 LBS | BODY MASS INDEX: 29.58 KG/M2 | OXYGEN SATURATION: 97 % | SYSTOLIC BLOOD PRESSURE: 140 MMHG | DIASTOLIC BLOOD PRESSURE: 86 MMHG | HEIGHT: 67 IN | RESPIRATION RATE: 14 BRPM | TEMPERATURE: 98.2 F

## 2023-01-04 DIAGNOSIS — B34.9 VIRAL INFECTION: ICD-10-CM

## 2023-01-04 DIAGNOSIS — R05.9 COUGH, UNSPECIFIED TYPE: Primary | ICD-10-CM

## 2023-01-05 ENCOUNTER — TELEPHONE (OUTPATIENT)
Dept: URGENT CARE | Facility: MEDICAL CENTER | Age: 43
End: 2023-01-05

## 2023-01-05 LAB
FLUAV RNA RESP QL NAA+PROBE: NEGATIVE
FLUBV RNA RESP QL NAA+PROBE: NEGATIVE
SARS-COV-2 RNA RESP QL NAA+PROBE: NEGATIVE

## 2023-01-05 NOTE — PATIENT INSTRUCTIONS
Earwax Blockage   AMBULATORY CARE:   Earwax  can build up in your ear canal and cause a blockage  Earwax blockage happens when your ear makes earwax faster than your body can remove it  Common symptoms include the following:   Trouble hearing    Earache    Ear fullness or a feeling that something is plugging up your ear    Itching or ringing in your ear    Dizziness    Seed immediate care if:   You feel dizzy  You have discharge or blood coming out of your ear  Your ear pain does not go away or gets worse  Call your doctor if:   You have a fever  You have trouble hearing or hear ringing noises  You have questions or concerns about your condition or care  Treatment for earwax blockage:   Medicines  placed in the ear canal can soften the earwax so it will come out  Flushing your ear canal  with warm water may flush out the earwax  Small medical tools  may be used to remove the earwax  How to prevent earwax blockage:  Do not stick anything into your ears to clean them  Use cotton swabs on the outside of your ear only  Ask your healthcare provider for more information on ways to prevent blockage  Follow up with your doctor as directed:  Write down your questions so you remember to ask them during your visits  © Copyright [employer] 2022 Information is for End User's use only and may not be sold, redistributed or otherwise used for commercial purposes  All illustrations and images included in CareNotes® are the copyrighted property of A D A M , Inc  or [facility] Ruthann Ferraro   The above information is an  only  It is not intended as medical advice for individual conditions or treatments  Talk to your doctor, nurse or pharmacist before following any medical regimen to see if it is safe and effective for you  Acute Cough   WHAT YOU NEED TO KNOW:   An acute cough can last up to 3 weeks   Common causes of an acute cough include a cold, allergies, or a lung infection  DISCHARGE INSTRUCTIONS:   Return to the emergency department if:   You have trouble breathing or feel short of breath  You cough up blood, or you see blood in your mucus  You faint or feel weak or dizzy  You have chest pain when you cough or take a deep breath  You have new wheezing  Contact your healthcare provider if:   You have a fever  Your cough lasts longer than 4 weeks  Your symptoms do not improve with treatment  You have questions or concerns about your condition or care  Medicines:   Medicines  may be needed to stop the cough, decrease swelling in your airways, or help open your airways  Medicine may also be given to help you cough up mucus  Ask your healthcare provider what over-the-counter medicines you can take  If you have an infection caused by bacteria, you may need antibiotics  Take your medicine as directed  Contact your healthcare provider if you think your medicine is not helping or if you have side effects  Tell him or her if you are allergic to any medicine  Keep a list of the medicines, vitamins, and herbs you take  Include the amounts, and when and why you take them  Bring the list or the pill bottles to follow-up visits  Carry your medicine list with you in case of an emergency  Manage your symptoms:   Do not smoke and stay away from others who smoke  Nicotine and other chemicals in cigarettes and cigars can cause lung damage and make your cough worse  Ask your healthcare provider for information if you currently smoke and need help to quit  E-cigarettes or smokeless tobacco still contain nicotine  Talk to your healthcare provider before you use these products  Drink extra liquids as directed  Liquids will help thin and loosen mucus so you can cough it up  Liquids will also help prevent dehydration  Examples of good liquids to drink include water, fruit juice, and broth  Do not drink liquids that contain caffeine   Caffeine can increase your risk for dehydration  Ask your healthcare provider how much liquid to drink each day  Rest as directed  Do not do activities that make your cough worse, such as exercise  Use a humidifier or vaporizer  Use a cool mist humidifier or a vaporizer to increase air moisture in your home  This may make it easier for you to breathe and help decrease your cough  Eat 2 to 5 mL of honey 2 times each day  Honey can help thin mucus and decrease your cough  Use cough drops or lozenges  These can help decrease throat irritation and your cough  Follow up with your healthcare provider as directed:  Write down your questions so you remember to ask them during your visits  © Copyright [employer] 2022 Information is for End User's use only and may not be sold, redistributed or otherwise used for commercial purposes  All illustrations and images included in CareNotes® are the copyrighted property of A D A M , Inc  or Madie Locke  The above information is an  only  It is not intended as medical advice for individual conditions or treatments  Talk to your doctor, nurse or pharmacist before following any medical regimen to see if it is safe and effective for you  Viral Syndrome   WHAT YOU NEED TO KNOW:   Viral syndrome is a term used for symptoms of an infection caused by a virus  Viruses are spread easily from person to person through the air and on shared items  DISCHARGE INSTRUCTIONS:   Call your local emergency number (911 in the [address] [place] [address],[address]) or have someone else call if:   You have a seizure  You cannot be woken  You have chest pain or trouble breathing  Return to the emergency department if:   You have a stiff neck, a bad headache, and sensitivity to light  You feel weak, dizzy, or confused  You stop urinating or urinate a lot less than usual     You cough up blood or thick yellow or green mucus      You have severe abdominal pain or your abdomen is larger than usual     Call your doctor if:   Your symptoms do not get better with treatment or get worse after 3 days  You have a rash or ear pain  You have burning when you urinate  You have questions or concerns about your condition or care  Medicines: You may  need any of the following:  Acetaminophen  decreases pain and fever  It is available without a doctor's order  Ask how much to take and how often to take it  Follow directions  Read the labels of all other medicines you are using to see if they also contain acetaminophen, or ask your doctor or pharmacist  Acetaminophen can cause liver damage if not taken correctly  Do not use more than 4 grams (4,000 milligrams) total of acetaminophen in one day  NSAIDs , such as ibuprofen, help decrease swelling, pain, and fever  NSAIDs can cause stomach bleeding or kidney problems in certain people  If you take blood thinner medicine, always ask your healthcare provider if NSAIDs are safe for you  Always read the medicine label and follow directions  Cold medicine  helps decrease swelling, control a cough, and relieve chest or nasal congestion  Saline nasal spray  helps decrease nasal congestion  Take your medicine as directed  Contact your healthcare provider if you think your medicine is not helping or if you have side effects  Tell him of her if you are allergic to any medicine  Keep a list of the medicines, vitamins, and herbs you take  Include the amounts, and when and why you take them  Bring the list or the pill bottles to follow-up visits  Carry your medicine list with you in case of an emergency  Manage your symptoms:   Drink liquids as directed to prevent dehydration  Ask how much liquid to drink each day and which liquids are best for you  Ask if you should drink an oral rehydration solution (ORS)  An ORS has the right amounts of water, salts, and sugar you need to replace body fluids  This may help prevent dehydration caused by vomiting or diarrhea   Do not drink liquids with caffeine  Liquids with caffeine can make dehydration worse  Get plenty of rest to help your body heal   Take naps throughout the day  Ask your healthcare provider when you can return to work and your normal activities  Use a cool mist humidifier to help you breathe easier  Ask your healthcare provider how to use a cool mist humidifier  Eat honey or use cough drops for a sore throat  Cough drops are available without a doctor's order  Follow directions for taking cough drops  Do not smoke or be close to anyone who is smoking  Nicotine and other chemicals in cigarettes and cigars can cause lung damage  Smoking can also delay healing  Ask your healthcare provider for information if you currently smoke and need help to quit  E-cigarettes or smokeless tobacco still contain nicotine  Talk to your healthcare provider before you use these products  Prevent the spread of germs:       Wash your hands often  Wash your hands several times each day  Wash after you use the bathroom, change a child's diaper, and before you prepare or eat food  Use soap and water every time  Rub your soapy hands together, lacing your fingers  Wash the front and back of your hands, and in between your fingers  Use the fingers of one hand to scrub under the fingernails of the other hand  Wash for at least 20 seconds  Rinse with warm, running water for several seconds  Then dry your hands with a clean towel or paper towel  Use hand  that contains alcohol if soap and water are not available  Do not touch your eyes, nose, or mouth without washing your hands first          Cover a sneeze or cough  Use a tissue that covers your mouth and nose  Throw the tissue away in a trash can right away  Use the bend of your arm if a tissue is not available  Wash your hands well with soap and water or use a hand   Stay away from others while you are sick  Avoid crowds as much as possible      Ask about vaccines you may need  Talk to your healthcare provider about your vaccine history  He or she will tell you which vaccines you need, and when to get them  Get the influenza (flu) vaccine as soon as recommended each year  The flu vaccine is available starting in September or October  Flu viruses change, so it is important to get a flu vaccine every year  Get the pneumonia vaccine if recommended  This vaccine is usually recommended every 5 years  Your provider will tell you when to get this vaccine, if needed  Follow up with your doctor as directed:  Write down your questions so you remember to ask them during your visits  © Copyright "[employer] 2022 Information is for End User's use only and may not be sold, redistributed or otherwise used for commercial purposes  All illustrations and images included in CareNotes® are the copyrighted property of A [employer] A M , Inc  or Madie Locke  The above information is an  only  It is not intended as medical advice for individual conditions or treatments  Talk to your doctor, nurse or pharmacist before following any medical regimen to see if it is safe and effective for you

## 2023-01-05 NOTE — PROGRESS NOTES
[facility]        NAME: Xavi Finnegan is a 40 y o  male  : 1980    MRN: [id number]  DATE: 2023  TIME: 8:08 PM    Assessment and Plan   Viral infection [B34 9]  1  Viral infection        2  Cough, unspecified type              Patient Instructions       Follow up with PCP in 3-5 days  Proceed to  ER if symptoms worsen  Chief Complaint     Chief Complaint   Patient presents with   • Cold Like Symptoms     Here today for blocked ears  Mother has been sick and dx with covid a few weeks agoand he is concerned he has it again  He was dx with covid early dec  Dry cough and sore throat  Eating and drinking well         History of Present Illness       Mother has cold symptmos and was dx with covid pt believes they both took many home covid testes over the past few days and obth were negative  Today he has blocked ears, dry intermittent cough, and mild sore throat  He denies any n/v/d and is eating and drinking normally  Review of Systems   Review of Systems   Constitutional: Negative  Negative for chills, fatigue and fever  HENT: Positive for sore throat  Negative for congestion, ear pain, postnasal drip, sinus pressure and trouble swallowing  Eyes: Negative  Negative for discharge and redness  Respiratory: Positive for cough  Negative for choking, shortness of breath and wheezing  Cardiovascular: Negative  Negative for chest pain  Gastrointestinal: Negative  Negative for abdominal pain  Endocrine: Negative  Genitourinary: Positive for dysuria  Musculoskeletal: Negative  Negative for gait problem  Skin: Negative  Negative for color change  Neurological: Negative  Negative for weakness and headaches  Psychiatric/Behavioral: Negative            Current Medications       Current Outpatient Medications:   •  divalproex sodium (DEPAKOTE ER) 250 mg 24 hr tablet, , Disp: , Rfl:   •  divalproex sodium (DEPAKOTE ER) 500 mg 24 hr tablet, Take 500 mg by mouth daily, Disp: , Rfl:     Current Allergies     Allergies as of 01/04/2023 - Reviewed 12/19/2022   Allergen Reaction Noted   • Bactrim [sulfamethoxazole-trimethoprim]  02/16/2020   • Dilantin [phenytoin]  02/16/2020   • Erythromycin Other (See Comments) 12/30/2016   • Hydromorphone Hives 12/30/2016   • Penicillins Other (See Comments) 12/30/2016            The following portions of the patient's history were reviewed and updated as appropriate: allergies, current medications, past family history, past medical history, past social history, past surgical history and problem list      Past Medical History:   Diagnosis Date   • Seizures ([address] )        No past surgical history on file  No family history on file  Medications have been verified  Objective   /86   Pulse 94   Temp 98 2 °F (36 8 °C)   Resp 14   Ht 5' 7" (1 702 m)   Wt 85 5 kg (188 lb 7 9 oz)   SpO2 97%   BMI 29 52 kg/m²   No LMP for male patient  Physical Exam     Physical Exam  Constitutional:       Appearance: He is well-developed  HENT:      Head: Normocephalic  Right Ear: Tympanic membrane, ear canal and external ear normal       Left Ear: Tympanic membrane, ear canal and external ear normal       Nose: Nose normal       Mouth/Throat:      Mouth: Mucous membranes are moist    Eyes:      Conjunctiva/sclera: Conjunctivae normal       Pupils: Pupils are equal, round, and reactive to light  Cardiovascular:      Rate and Rhythm: Normal rate  Pulmonary:      Effort: Pulmonary effort is normal       Breath sounds: Normal breath sounds  Abdominal:      General: Abdomen is flat  Bowel sounds are normal       Palpations: Abdomen is soft  Musculoskeletal:         General: Normal range of motion  Cervical back: Normal range of motion  Skin:     General: Skin is warm and dry  Neurological:      Mental Status: He is alert and oriented to person, place, and time     Psychiatric:         Mood and Affect: Mood normal  Behavior: Behavior normal

## 2023-03-05 PROBLEM — R05.9 COUGH: Status: RESOLVED | Noted: 2023-01-04 | Resolved: 2023-03-05

## 2023-06-05 ENCOUNTER — APPOINTMENT (OUTPATIENT)
Dept: LAB | Facility: HOSPITAL | Age: 43
End: 2023-06-05
Payer: COMMERCIAL

## 2023-06-05 DIAGNOSIS — G40.909 NONINTRACTABLE EPILEPSY WITHOUT STATUS EPILEPTICUS, UNSPECIFIED EPILEPSY TYPE (HCC): ICD-10-CM

## 2023-06-05 LAB
25(OH)D3 SERPL-MCNC: 45.7 NG/ML (ref 30–100)
VALPROATE SERPL-MCNC: 33 UG/ML (ref 50–100)

## 2023-06-05 PROCEDURE — 80164 ASSAY DIPROPYLACETIC ACD TOT: CPT

## 2023-06-05 PROCEDURE — 82306 VITAMIN D 25 HYDROXY: CPT

## 2023-06-05 PROCEDURE — 36415 COLL VENOUS BLD VENIPUNCTURE: CPT

## 2023-07-19 ENCOUNTER — HOSPITAL ENCOUNTER (OUTPATIENT)
Dept: NEUROLOGY | Facility: HOSPITAL | Age: 43
Discharge: HOME | End: 2023-07-19
Payer: COMMERCIAL

## 2023-07-19 DIAGNOSIS — G40.909 NONINTRACTABLE EPILEPSY WITHOUT STATUS EPILEPTICUS, UNSPECIFIED EPILEPSY TYPE (HCC): ICD-10-CM

## 2023-07-19 PROCEDURE — 95816 EEG AWAKE AND DROWSY: CPT

## 2023-07-20 PROCEDURE — 95819 EEG AWAKE AND ASLEEP: CPT | Performed by: STUDENT IN AN ORGANIZED HEALTH CARE EDUCATION/TRAINING PROGRAM

## 2024-03-12 ENCOUNTER — APPOINTMENT (OUTPATIENT)
Dept: LAB | Facility: HOSPITAL | Age: 44
End: 2024-03-12
Payer: COMMERCIAL

## 2024-03-12 DIAGNOSIS — G40.909 NONINTRACTABLE EPILEPSY WITHOUT STATUS EPILEPTICUS, UNSPECIFIED EPILEPSY TYPE (HCC): ICD-10-CM

## 2024-03-12 LAB
25(OH)D3 SERPL-MCNC: 34.6 NG/ML (ref 30–100)
ALBUMIN SERPL BCP-MCNC: 4.4 G/DL (ref 3.5–5)
ALP SERPL-CCNC: 53 U/L (ref 34–104)
ALT SERPL W P-5'-P-CCNC: 14 U/L (ref 7–52)
ANION GAP SERPL CALCULATED.3IONS-SCNC: 9 MMOL/L (ref 4–13)
AST SERPL W P-5'-P-CCNC: 11 U/L (ref 13–39)
BILIRUB SERPL-MCNC: 0.64 MG/DL (ref 0.2–1)
BUN SERPL-MCNC: 11 MG/DL (ref 5–25)
CALCIUM SERPL-MCNC: 9.3 MG/DL (ref 8.4–10.2)
CHLORIDE SERPL-SCNC: 105 MMOL/L (ref 96–108)
CO2 SERPL-SCNC: 27 MMOL/L (ref 21–32)
CREAT SERPL-MCNC: 0.82 MG/DL (ref 0.6–1.3)
ERYTHROCYTE [DISTWIDTH] IN BLOOD BY AUTOMATED COUNT: 12.4 % (ref 11.6–15.1)
GFR SERPL CREATININE-BSD FRML MDRD: 109 ML/MIN/1.73SQ M
GLUCOSE SERPL-MCNC: 104 MG/DL (ref 65–140)
HCT VFR BLD AUTO: 44.1 % (ref 36.5–49.3)
HGB BLD-MCNC: 14.7 G/DL (ref 12–17)
MCH RBC QN AUTO: 29.4 PG (ref 26.8–34.3)
MCHC RBC AUTO-ENTMCNC: 33.3 G/DL (ref 31.4–37.4)
MCV RBC AUTO: 88 FL (ref 82–98)
PLATELET # BLD AUTO: 155 THOUSANDS/UL (ref 149–390)
PMV BLD AUTO: 9.2 FL (ref 8.9–12.7)
POTASSIUM SERPL-SCNC: 4.1 MMOL/L (ref 3.5–5.3)
PROT SERPL-MCNC: 6.2 G/DL (ref 6.4–8.4)
RBC # BLD AUTO: 5 MILLION/UL (ref 3.88–5.62)
SODIUM SERPL-SCNC: 141 MMOL/L (ref 135–147)
VALPROATE SERPL-MCNC: 42 UG/ML (ref 50–100)
WBC # BLD AUTO: 4.57 THOUSAND/UL (ref 4.31–10.16)

## 2024-03-12 PROCEDURE — 80164 ASSAY DIPROPYLACETIC ACD TOT: CPT

## 2024-03-12 PROCEDURE — 80053 COMPREHEN METABOLIC PANEL: CPT

## 2024-03-12 PROCEDURE — 82306 VITAMIN D 25 HYDROXY: CPT

## 2024-03-12 PROCEDURE — 36415 COLL VENOUS BLD VENIPUNCTURE: CPT

## 2024-03-12 PROCEDURE — 85027 COMPLETE CBC AUTOMATED: CPT

## 2024-06-02 ENCOUNTER — HOSPITAL ENCOUNTER (EMERGENCY)
Facility: HOSPITAL | Age: 44
Discharge: HOME | End: 2024-06-02
Attending: EMERGENCY MEDICINE
Payer: COMMERCIAL

## 2024-06-02 VITALS
HEART RATE: 76 BPM | TEMPERATURE: 97.8 F | BODY MASS INDEX: 29.66 KG/M2 | SYSTOLIC BLOOD PRESSURE: 144 MMHG | RESPIRATION RATE: 18 BRPM | WEIGHT: 189 LBS | DIASTOLIC BLOOD PRESSURE: 85 MMHG | OXYGEN SATURATION: 98 % | HEIGHT: 67 IN

## 2024-06-02 DIAGNOSIS — S06.0XAA CONCUSSION: Primary | ICD-10-CM

## 2024-06-02 PROCEDURE — 99283 EMERGENCY DEPT VISIT LOW MDM: CPT | Performed by: EMERGENCY MEDICINE

## 2024-06-02 PROCEDURE — 99283 EMERGENCY DEPT VISIT LOW MDM: CPT

## 2024-06-03 NOTE — ED PROVIDER NOTES
The history  Chief Complaint   Patient presents with    Headache     Pt presents with head pain after bumping his head several times over the past week, denies any LOC, no thinners     44-year-old male who presents for head strike.  Patient reports that over the past 2 weeks or so, he has had several episodes where he hit his head.  He describes at 1 point he was startled and hit his head on windowsill, he describes that he hit his head on the inside opening the car door, he also describes hitting his head while in a cavern (walked into low ceiling).  He denies any loss of consciousness with any of these episodes.  No nausea or vomiting.  The only symptom he reports is minimal fatigue.  Denies any focal weakness, no difficulty with ambulation.  He does report feeling mildly dizzy when changing positions although this quickly resolves on its own.  He denies any blood thinners, no aspirin or Plavix.  Currently he states that he is asymptomatic other than just some mild pain where he hit his head. No neck pain. Review of systems otherwise negative        Prior to Admission Medications   Prescriptions Last Dose Informant Patient Reported? Taking?   divalproex sodium (DEPAKOTE ER) 250 mg 24 hr tablet   Yes No   divalproex sodium (DEPAKOTE ER) 500 mg 24 hr tablet   Yes No   Sig: Take 500 mg by mouth daily      Facility-Administered Medications: None       Past Medical History:   Diagnosis Date    Seizures (HCC)        History reviewed. No pertinent surgical history.    History reviewed. No pertinent family history.  I have reviewed and agree with the history as documented.    E-Cigarette/Vaping    E-Cigarette Use Never User      E-Cigarette/Vaping Substances     Social History     Tobacco Use    Smoking status: Never    Smokeless tobacco: Never   Vaping Use    Vaping status: Never Used   Substance Use Topics    Alcohol use: Yes     Comment: social    Drug use: Never       Review of Systems   Constitutional:  Negative for  chills, diaphoresis, fatigue and fever.   HENT:  Negative for congestion and sore throat.    Eyes:  Negative for visual disturbance.   Respiratory:  Negative for cough, chest tightness and shortness of breath.    Cardiovascular:  Negative for chest pain, palpitations and leg swelling.   Gastrointestinal:  Negative for abdominal distention, abdominal pain, constipation, diarrhea, nausea and vomiting.   Genitourinary:  Negative for difficulty urinating and dysuria.   Musculoskeletal:  Negative for arthralgias and myalgias.   Skin:  Negative for rash.   Neurological:  Positive for headaches. Negative for dizziness, weakness, light-headedness and numbness.   Psychiatric/Behavioral:  Negative for agitation, behavioral problems and confusion. The patient is not nervous/anxious.    All other systems reviewed and are negative.      Physical Exam  Physical Exam  Constitutional:       Appearance: He is well-developed.   HENT:      Head: Normocephalic and atraumatic.      Right Ear: Tympanic membrane normal.      Left Ear: Tympanic membrane normal.   Cardiovascular:      Rate and Rhythm: Normal rate and regular rhythm.      Heart sounds: Normal heart sounds. No murmur heard.  Pulmonary:      Effort: Pulmonary effort is normal. No respiratory distress.      Breath sounds: Normal breath sounds.   Abdominal:      General: Bowel sounds are normal.      Palpations: Abdomen is soft.   Musculoskeletal:         General: No deformity.   Skin:     General: Skin is warm.      Findings: No rash.   Neurological:      Mental Status: He is alert and oriented to person, place, and time.      Comments: Patient AAOx3. CN II-XII intact. Normal CFTs. 5/5 strength in all extremities. Normal sensation in all extremities.      Psychiatric:         Behavior: Behavior normal.         Thought Content: Thought content normal.         Judgment: Judgment normal.         Vital Signs  ED Triage Vitals [06/02/24 2332]   Temperature Pulse Respirations Blood  Pressure SpO2   97.8 °F (36.6 °C) 76 18 144/85 98 %      Temp Source Heart Rate Source Patient Position - Orthostatic VS BP Location FiO2 (%)   Temporal Monitor Sitting Left arm --      Pain Score       5           Vitals:    06/02/24 2332   BP: 144/85   Pulse: 76   Patient Position - Orthostatic VS: Sitting         Visual Acuity      ED Medications  Medications - No data to display    Diagnostic Studies  Results Reviewed       None                   No orders to display              Procedures  Procedures         ED Course                               SBIRT 20yo+      Flowsheet Row Most Recent Value   Initial Alcohol Screen: US AUDIT-C     1. How often do you have a drink containing alcohol? 0 Filed at: 06/02/2024 2334   2. How many drinks containing alcohol do you have on a typical day you are drinking?  0 Filed at: 06/02/2024 2334   3b. FEMALE Any Age, or MALE 65+: How often do you have 4 or more drinks on one occassion? 0 Filed at: 06/02/2024 2334   Audit-C Score 0 Filed at: 06/02/2024 2334   MILAGROS: How many times in the past year have you...    Used an illegal drug or used a prescription medication for non-medical reasons? Never Filed at: 06/02/2024 2334                      Medical Decision Making  I reviewed the patient's medical chart, PMHx, prior encounters, medications.    My DDx includes: concussion, traumatic ICH is a consideration    Despite traumatic ICH on differential, per [place] CT head rules, patient does not meet criteria for CT scan at this time.  He is very well-appearing, has minimal complaints other than occasional mild fatigue and dizziness with certain changes in position.  He has a normal neurological exam without clinical evidence of traumatic ICH.  He is not on blood thinners.  Patient feels comfortable holding off on CT at this time we will continue to evaluate symptoms at home.  Discussed need for rest, recommend follow-up with concussion clinic.  He is agreeable with this plan,  discharged             Disposition  Final diagnoses:   Concussion     Time reflects when diagnosis was documented in both MDM as applicable and the Disposition within this note       Time User Action Codes Description Comment    6/2/2024 11:45 PM Bartolo Watson Add [S06.0XAA] Concussion           ED Disposition       ED Disposition   Discharge    Condition   Stable    Date/Time   Sun Jun 2, 2024 7443    Comment   Bobby Escobar discharge to home/self care.                   Follow-up Information       Follow up With Specialties Details Why Contact Info Additional Information    Ronnell Oro DO Family Medicine Call  For re-evaluation [address] [place] [id number] [phone]       Physical Therapy at [facility] Physical Therapy Call  For re-evaluation [address] [address]  [phone] Physical Therapy at [facility], [address] [place] [address]    [phone]            Patient's Medications   Discharge Prescriptions    No medications on file           PDMP Review       None            ED Provider  Electronically Signed by             Bartolo Watson MD  06/02/24 8929

## 2024-10-08 ENCOUNTER — APPOINTMENT (OUTPATIENT)
Dept: LAB | Facility: HOSPITAL | Age: 44
End: 2024-10-08
Payer: COMMERCIAL

## 2024-10-08 ENCOUNTER — HOSPITAL ENCOUNTER (OUTPATIENT)
Dept: NEUROLOGY | Facility: HOSPITAL | Age: 44
Discharge: HOME | End: 2024-10-08
Payer: COMMERCIAL

## 2024-10-08 DIAGNOSIS — F32.A DEPRESSION, UNSPECIFIED DEPRESSION TYPE: ICD-10-CM

## 2024-10-08 DIAGNOSIS — G40.309 NONINTRACTABLE GENERALIZED IDIOPATHIC EPILEPSY WITHOUT STATUS EPILEPTICUS (HCC): ICD-10-CM

## 2024-10-08 LAB
25(OH)D3 SERPL-MCNC: 18.9 NG/ML (ref 30–100)
ALBUMIN SERPL BCG-MCNC: 4.6 G/DL (ref 3.5–5)
ALP SERPL-CCNC: 59 U/L (ref 34–104)
ALT SERPL W P-5'-P-CCNC: 17 U/L (ref 7–52)
ANION GAP SERPL CALCULATED.3IONS-SCNC: 6 MMOL/L (ref 4–13)
AST SERPL W P-5'-P-CCNC: 14 U/L (ref 13–39)
BILIRUB SERPL-MCNC: 0.57 MG/DL (ref 0.2–1)
BUN SERPL-MCNC: 15 MG/DL (ref 5–25)
CALCIUM SERPL-MCNC: 9.4 MG/DL (ref 8.4–10.2)
CHLORIDE SERPL-SCNC: 102 MMOL/L (ref 96–108)
CO2 SERPL-SCNC: 31 MMOL/L (ref 21–32)
CREAT SERPL-MCNC: 0.8 MG/DL (ref 0.6–1.3)
GFR SERPL CREATININE-BSD FRML MDRD: 109 ML/MIN/1.73SQ M
GLUCOSE SERPL-MCNC: 70 MG/DL (ref 65–140)
POTASSIUM SERPL-SCNC: 4.4 MMOL/L (ref 3.5–5.3)
PROT SERPL-MCNC: 6.5 G/DL (ref 6.4–8.4)
SODIUM SERPL-SCNC: 139 MMOL/L (ref 135–147)
VALPROATE SERPL-MCNC: 52 UG/ML (ref 50–100)

## 2024-10-08 PROCEDURE — 95816 EEG AWAKE AND DROWSY: CPT

## 2024-10-08 PROCEDURE — 82306 VITAMIN D 25 HYDROXY: CPT

## 2024-10-08 PROCEDURE — 95816 EEG AWAKE AND DROWSY: CPT | Performed by: PSYCHIATRY & NEUROLOGY

## 2024-10-08 PROCEDURE — 80164 ASSAY DIPROPYLACETIC ACD TOT: CPT

## 2024-10-08 PROCEDURE — 36415 COLL VENOUS BLD VENIPUNCTURE: CPT

## 2024-10-08 PROCEDURE — 80053 COMPREHEN METABOLIC PANEL: CPT

## 2024-10-17 ENCOUNTER — HOSPITAL ENCOUNTER (OUTPATIENT)
Facility: HOSPITAL | Age: 44
Setting detail: OBSERVATION
Discharge: HOME | End: 2024-10-19
Attending: EMERGENCY MEDICINE | Admitting: FAMILY MEDICINE
Payer: COMMERCIAL

## 2024-10-17 ENCOUNTER — APPOINTMENT (OUTPATIENT)
Dept: CT IMAGING | Facility: HOSPITAL | Age: 44
End: 2024-10-17
Payer: COMMERCIAL

## 2024-10-17 ENCOUNTER — APPOINTMENT (EMERGENCY)
Dept: RADIOLOGY | Facility: HOSPITAL | Age: 44
End: 2024-10-17
Payer: COMMERCIAL

## 2024-10-17 DIAGNOSIS — G40.909 NONINTRACTABLE EPILEPSY WITHOUT STATUS EPILEPTICUS (HCC): ICD-10-CM

## 2024-10-17 DIAGNOSIS — I48.91 NEW ONSET ATRIAL FIBRILLATION (HCC): Primary | ICD-10-CM

## 2024-10-17 DIAGNOSIS — D69.6 THROMBOCYTOPENIA (HCC): ICD-10-CM

## 2024-10-17 DIAGNOSIS — I48.91 ATRIAL FIBRILLATION WITH RVR (HCC): ICD-10-CM

## 2024-10-17 PROBLEM — B34.9 VIRAL INFECTION: Status: RESOLVED | Noted: 2023-01-04 | Resolved: 2024-10-17

## 2024-10-17 PROBLEM — G43.009 MIGRAINE WITHOUT AURA AND WITHOUT STATUS MIGRAINOSUS, NOT INTRACTABLE: Status: ACTIVE | Noted: 2022-12-09

## 2024-10-17 PROBLEM — N48.6 PEYRONIE DISEASE: Status: ACTIVE | Noted: 2023-01-24

## 2024-10-17 PROBLEM — F41.9 ANXIETY: Status: ACTIVE | Noted: 2023-01-24

## 2024-10-17 PROBLEM — F32.A DEPRESSION: Status: ACTIVE | Noted: 2024-03-23

## 2024-10-17 PROBLEM — I48.0 PAROXYSMAL ATRIAL FIBRILLATION (HCC): Status: ACTIVE | Noted: 2024-10-17

## 2024-10-17 LAB
2HR DELTA HS TROPONIN: 9 NG/L
4HR DELTA HS TROPONIN: 15 NG/L
ALBUMIN SERPL BCG-MCNC: 4.5 G/DL (ref 3.5–5)
ALP SERPL-CCNC: 49 U/L (ref 34–104)
ALT SERPL W P-5'-P-CCNC: 17 U/L (ref 7–52)
AMPHETAMINES SERPL QL SCN: NEGATIVE
ANION GAP SERPL CALCULATED.3IONS-SCNC: 11 MMOL/L (ref 4–13)
APTT PPP: 24 SECONDS (ref 23–34)
AST SERPL W P-5'-P-CCNC: 16 U/L (ref 13–39)
BARBITURATES UR QL: NEGATIVE
BASOPHILS # BLD AUTO: 0.01 THOUSANDS/ΜL (ref 0–0.1)
BASOPHILS NFR BLD AUTO: 0 % (ref 0–1)
BENZODIAZ UR QL: NEGATIVE
BILIRUB SERPL-MCNC: 0.6 MG/DL (ref 0.2–1)
BILIRUB UR QL STRIP: NEGATIVE
BNP SERPL-MCNC: 49 PG/ML (ref 0–100)
BUN SERPL-MCNC: 11 MG/DL (ref 5–25)
CALCIUM SERPL-MCNC: 8.8 MG/DL (ref 8.4–10.2)
CARDIAC TROPONIN I PNL SERPL HS: 15 NG/L
CARDIAC TROPONIN I PNL SERPL HS: 21 NG/L
CARDIAC TROPONIN I PNL SERPL HS: 6 NG/L
CHLORIDE SERPL-SCNC: 103 MMOL/L (ref 96–108)
CLARITY UR: CLEAR
CO2 SERPL-SCNC: 25 MMOL/L (ref 21–32)
COCAINE UR QL: NEGATIVE
COLOR UR: ABNORMAL
CREAT SERPL-MCNC: 0.87 MG/DL (ref 0.6–1.3)
D DIMER PPP FEU-MCNC: <0.27 UG/ML FEU
EOSINOPHIL # BLD AUTO: 0.01 THOUSAND/ΜL (ref 0–0.61)
EOSINOPHIL NFR BLD AUTO: 0 % (ref 0–6)
ERYTHROCYTE [DISTWIDTH] IN BLOOD BY AUTOMATED COUNT: 11.9 % (ref 11.6–15.1)
EST. AVERAGE GLUCOSE BLD GHB EST-MCNC: 94 MG/DL
FENTANYL UR QL SCN: NEGATIVE
GFR SERPL CREATININE-BSD FRML MDRD: 105 ML/MIN/1.73SQ M
GLUCOSE SERPL-MCNC: 91 MG/DL (ref 65–140)
GLUCOSE UR STRIP-MCNC: NEGATIVE MG/DL
HBA1C MFR BLD: 4.9 %
HCT VFR BLD AUTO: 42.1 % (ref 36.5–49.3)
HGB BLD-MCNC: 14.5 G/DL (ref 12–17)
HGB UR QL STRIP.AUTO: NEGATIVE
HYDROCODONE UR QL SCN: NEGATIVE
IMM GRANULOCYTES # BLD AUTO: 0.02 THOUSAND/UL (ref 0–0.2)
IMM GRANULOCYTES NFR BLD AUTO: 0 % (ref 0–2)
INR PPP: 1.01 (ref 0.85–1.19)
KETONES UR STRIP-MCNC: ABNORMAL MG/DL
LEUKOCYTE ESTERASE UR QL STRIP: NEGATIVE
LYMPHOCYTES # BLD AUTO: 0.51 THOUSANDS/ΜL (ref 0.6–4.47)
LYMPHOCYTES NFR BLD AUTO: 8 % (ref 14–44)
MAGNESIUM SERPL-MCNC: 2.2 MG/DL (ref 1.9–2.7)
MCH RBC QN AUTO: 29.9 PG (ref 26.8–34.3)
MCHC RBC AUTO-ENTMCNC: 34.4 G/DL (ref 31.4–37.4)
MCV RBC AUTO: 87 FL (ref 82–98)
METHADONE UR QL: NEGATIVE
MONOCYTES # BLD AUTO: 0.32 THOUSAND/ΜL (ref 0.17–1.22)
MONOCYTES NFR BLD AUTO: 5 % (ref 4–12)
NEUTROPHILS # BLD AUTO: 5.8 THOUSANDS/ΜL (ref 1.85–7.62)
NEUTS SEG NFR BLD AUTO: 87 % (ref 43–75)
NITRITE UR QL STRIP: NEGATIVE
NRBC BLD AUTO-RTO: 0 /100 WBCS
OPIATES UR QL SCN: NEGATIVE
OXYCODONE+OXYMORPHONE UR QL SCN: NEGATIVE
PCP UR QL: NEGATIVE
PH UR STRIP.AUTO: 5.5 [PH]
PLATELET # BLD AUTO: 144 THOUSANDS/UL (ref 149–390)
PMV BLD AUTO: 9 FL (ref 8.9–12.7)
POTASSIUM SERPL-SCNC: 3.7 MMOL/L (ref 3.5–5.3)
PROT SERPL-MCNC: 6.5 G/DL (ref 6.4–8.4)
PROT UR STRIP-MCNC: NEGATIVE MG/DL
PROTHROMBIN TIME: 13.8 SECONDS (ref 12.3–15)
RBC # BLD AUTO: 4.85 MILLION/UL (ref 3.88–5.62)
SARS-COV-2 RNA RESP QL NAA+PROBE: NEGATIVE
SODIUM SERPL-SCNC: 139 MMOL/L (ref 135–147)
SP GR UR STRIP.AUTO: 1.01 (ref 1–1.03)
THC UR QL: NEGATIVE
TSH SERPL DL<=0.05 MIU/L-ACNC: 1.21 UIU/ML (ref 0.45–4.5)
UROBILINOGEN UR STRIP-ACNC: <2 MG/DL
VALPROATE SERPL-MCNC: 42 UG/ML (ref 50–100)
WBC # BLD AUTO: 6.67 THOUSAND/UL (ref 4.31–10.16)

## 2024-10-17 PROCEDURE — 87635 SARS-COV-2 COVID-19 AMP PRB: CPT | Performed by: FAMILY MEDICINE

## 2024-10-17 PROCEDURE — 83880 ASSAY OF NATRIURETIC PEPTIDE: CPT | Performed by: STUDENT IN AN ORGANIZED HEALTH CARE EDUCATION/TRAINING PROGRAM

## 2024-10-17 PROCEDURE — 85379 FIBRIN DEGRADATION QUANT: CPT | Performed by: STUDENT IN AN ORGANIZED HEALTH CARE EDUCATION/TRAINING PROGRAM

## 2024-10-17 PROCEDURE — 36415 COLL VENOUS BLD VENIPUNCTURE: CPT | Performed by: STUDENT IN AN ORGANIZED HEALTH CARE EDUCATION/TRAINING PROGRAM

## 2024-10-17 PROCEDURE — 84484 ASSAY OF TROPONIN QUANT: CPT | Performed by: STUDENT IN AN ORGANIZED HEALTH CARE EDUCATION/TRAINING PROGRAM

## 2024-10-17 PROCEDURE — 99285 EMERGENCY DEPT VISIT HI MDM: CPT

## 2024-10-17 PROCEDURE — 83735 ASSAY OF MAGNESIUM: CPT | Performed by: STUDENT IN AN ORGANIZED HEALTH CARE EDUCATION/TRAINING PROGRAM

## 2024-10-17 PROCEDURE — 85610 PROTHROMBIN TIME: CPT | Performed by: STUDENT IN AN ORGANIZED HEALTH CARE EDUCATION/TRAINING PROGRAM

## 2024-10-17 PROCEDURE — 84443 ASSAY THYROID STIM HORMONE: CPT | Performed by: STUDENT IN AN ORGANIZED HEALTH CARE EDUCATION/TRAINING PROGRAM

## 2024-10-17 PROCEDURE — 80164 ASSAY DIPROPYLACETIC ACD TOT: CPT | Performed by: STUDENT IN AN ORGANIZED HEALTH CARE EDUCATION/TRAINING PROGRAM

## 2024-10-17 PROCEDURE — 81003 URINALYSIS AUTO W/O SCOPE: CPT | Performed by: STUDENT IN AN ORGANIZED HEALTH CARE EDUCATION/TRAINING PROGRAM

## 2024-10-17 PROCEDURE — 96365 THER/PROPH/DIAG IV INF INIT: CPT

## 2024-10-17 PROCEDURE — 93005 ELECTROCARDIOGRAM TRACING: CPT

## 2024-10-17 PROCEDURE — 85730 THROMBOPLASTIN TIME PARTIAL: CPT | Performed by: STUDENT IN AN ORGANIZED HEALTH CARE EDUCATION/TRAINING PROGRAM

## 2024-10-17 PROCEDURE — 99223 1ST HOSP IP/OBS HIGH 75: CPT | Performed by: FAMILY MEDICINE

## 2024-10-17 PROCEDURE — 83036 HEMOGLOBIN GLYCOSYLATED A1C: CPT | Performed by: STUDENT IN AN ORGANIZED HEALTH CARE EDUCATION/TRAINING PROGRAM

## 2024-10-17 PROCEDURE — 80053 COMPREHEN METABOLIC PANEL: CPT | Performed by: STUDENT IN AN ORGANIZED HEALTH CARE EDUCATION/TRAINING PROGRAM

## 2024-10-17 PROCEDURE — 96375 TX/PRO/DX INJ NEW DRUG ADDON: CPT

## 2024-10-17 PROCEDURE — 85025 COMPLETE CBC W/AUTO DIFF WBC: CPT | Performed by: STUDENT IN AN ORGANIZED HEALTH CARE EDUCATION/TRAINING PROGRAM

## 2024-10-17 PROCEDURE — 80307 DRUG TEST PRSMV CHEM ANLYZR: CPT | Performed by: STUDENT IN AN ORGANIZED HEALTH CARE EDUCATION/TRAINING PROGRAM

## 2024-10-17 PROCEDURE — 70450 CT HEAD/BRAIN W/O DYE: CPT

## 2024-10-17 PROCEDURE — 99244 OFF/OP CNSLTJ NEW/EST MOD 40: CPT | Performed by: PSYCHIATRY & NEUROLOGY

## 2024-10-17 PROCEDURE — 96376 TX/PRO/DX INJ SAME DRUG ADON: CPT

## 2024-10-17 PROCEDURE — 71045 X-RAY EXAM CHEST 1 VIEW: CPT

## 2024-10-17 PROCEDURE — 99291 CRITICAL CARE FIRST HOUR: CPT | Performed by: EMERGENCY MEDICINE

## 2024-10-17 RX ORDER — SUMATRIPTAN 100 MG/1
100 TABLET, FILM COATED ORAL
COMMUNITY

## 2024-10-17 RX ORDER — ONDANSETRON 2 MG/ML
1 INJECTION INTRAMUSCULAR; INTRAVENOUS ONCE
Status: COMPLETED | OUTPATIENT
Start: 2024-10-17 | End: 2024-10-17

## 2024-10-17 RX ORDER — METOPROLOL TARTRATE 25 MG/1
25 TABLET, FILM COATED ORAL EVERY 12 HOURS SCHEDULED
Status: DISCONTINUED | OUTPATIENT
Start: 2024-10-17 | End: 2024-10-17

## 2024-10-17 RX ORDER — MAGNESIUM SULFATE HEPTAHYDRATE 40 MG/ML
2 INJECTION, SOLUTION INTRAVENOUS ONCE
Status: COMPLETED | OUTPATIENT
Start: 2024-10-17 | End: 2024-10-17

## 2024-10-17 RX ORDER — ACETAMINOPHEN 325 MG/1
650 TABLET ORAL EVERY 6 HOURS PRN
Status: DISCONTINUED | OUTPATIENT
Start: 2024-10-17 | End: 2024-10-19 | Stop reason: HOSPADM

## 2024-10-17 RX ORDER — DIVALPROEX SODIUM 250 MG/1
750 TABLET, FILM COATED, EXTENDED RELEASE ORAL DAILY
Status: DISCONTINUED | OUTPATIENT
Start: 2024-10-18 | End: 2024-10-18

## 2024-10-17 RX ORDER — ONDANSETRON 2 MG/ML
4 INJECTION INTRAMUSCULAR; INTRAVENOUS EVERY 6 HOURS PRN
Status: DISCONTINUED | OUTPATIENT
Start: 2024-10-17 | End: 2024-10-19 | Stop reason: HOSPADM

## 2024-10-17 RX ORDER — DILTIAZEM HYDROCHLORIDE 5 MG/ML
15 INJECTION INTRAVENOUS ONCE
Status: COMPLETED | OUTPATIENT
Start: 2024-10-17 | End: 2024-10-17

## 2024-10-17 RX ADMIN — SODIUM CHLORIDE, SODIUM LACTATE, POTASSIUM CHLORIDE, AND CALCIUM CHLORIDE 1000 ML: .6; .31; .03; .02 INJECTION, SOLUTION INTRAVENOUS at 14:56

## 2024-10-17 RX ADMIN — SERTRALINE HYDROCHLORIDE 50 MG: 50 TABLET ORAL at 17:17

## 2024-10-17 RX ADMIN — Medication 12.5 MG: at 21:44

## 2024-10-17 RX ADMIN — ACETAMINOPHEN 650 MG: 325 TABLET ORAL at 21:51

## 2024-10-17 RX ADMIN — APIXABAN 5 MG: 5 TABLET, FILM COATED ORAL at 17:17

## 2024-10-17 RX ADMIN — MAGNESIUM SULFATE HEPTAHYDRATE 2 G: 40 INJECTION, SOLUTION INTRAVENOUS at 14:02

## 2024-10-17 RX ADMIN — SODIUM CHLORIDE, SODIUM LACTATE, POTASSIUM CHLORIDE, AND CALCIUM CHLORIDE 1000 ML: .6; .31; .03; .02 INJECTION, SOLUTION INTRAVENOUS at 14:02

## 2024-10-17 RX ADMIN — DILTIAZEM HYDROCHLORIDE 15 MG: 5 INJECTION, SOLUTION INTRAVENOUS at 14:44

## 2024-10-17 NOTE — CONSULTS
Consultation - Neurology   Name: Bobby Escobar 44 y.o. male I MRN: [id number]  Unit/Bed#: 415-01 I Date of Admission: 10/17/2024   Date of Service: 10/17/2024 I Hospital Day: 0   Inpatient consult to Neurology  Consult performed by: Agustin Roberts DO  Consult ordered by: Kim Pavon DO        Physician Requesting Evaluation: Zaid Henson MD   Reason for Evaluation / Principal Problem: Epilepsy/seizure    Assessment & Plan  Paroxysmal atrial fibrillation (HCC)    Nonintractable epilepsy without status epilepticus (HCC)  Breakthrough seizure, no clear provoking etiology.  - Suspect A-fib/arrhythmia may have been provoked by seizure.  Recommend further workup by medicine and/or cardiology to exclude other possible causes.  - Will increase home VPA dose from 750 to 1000 mg daily.  - Most recent EEG done by outside hospital was normal.  - Patient reports that his neurologist is attempting to set up an ambulatory study which I think would be helpful.  - No clear need for any additional neuroimaging or workup at this time.  - Recommend 24-hour seizure-free period prior to discharge.  - Please call questions/concerns  Depression    Thrombocytopenia (HCC)        Patient can continue to follow-up with already established neurology    History of Present Illness   Bobby Escobar is a 44 y.o. right handed male with epilepsy maintained on VPA 1250 mg daily, and migraine headaches maintained with Imitrex abortive treatment who presents with unresponsive episode found to be in A-fib with RVR.  No history of cardiac disease or A-fib.  - Heart rate in the 100s to 150s.,  Subsequently rate controlled with diltiazem.    Patient reports having an unexplained episode of urinary incontinence during sleep last night.  Awoke this morning somewhat tired.  Went to take a nap downstairs on the couch and next thing he knew EMS was around him.  Apparently he had fallen off the couch and possibly had some seizure-like  activity.          Review of Systems   Constitutional:  Positive for fatigue. Negative for activity change, chills and fever.   HENT:  Negative for hearing loss and tinnitus.    Eyes:  Negative for photophobia and visual disturbance.   Respiratory:  Negative for cough and shortness of breath.    Cardiovascular:  Negative for chest pain.   Gastrointestinal:  Negative for constipation and diarrhea.   Genitourinary:  Negative for dysuria and urgency.   Skin:  Negative for pallor and rash.   Neurological:  Negative for dizziness, tremors, facial asymmetry, speech difficulty, weakness, light-headedness, numbness and headaches.   Psychiatric/Behavioral:  Positive for decreased concentration. Negative for agitation, confusion and sleep disturbance.    All other systems reviewed and are negative.       I have reviewed the patient's PMH, PSH, Social History, Family History, Meds, and Allergies    Objective :  Temp:  [97.1 °F (36.2 °C)-97.7 °F (36.5 °C)] 97.7 °F (36.5 °C)  HR:  [] 71  BP: ()/(53-84) 101/58  Resp:  [12-22] 18  SpO2:  [95 %-100 %] 99 %  O2 Device: None (Room air)    Physical Exam  Vitals reviewed.   Constitutional:       General: He is not in acute distress.     Appearance: Normal appearance. He is well-developed. He is not ill-appearing, toxic-appearing or diaphoretic.   HENT:      Head: Normocephalic and atraumatic.      Right Ear: External ear normal.      Left Ear: External ear normal.      Nose: Nose normal.   Eyes:      General: Lids are normal. No scleral icterus.        Right eye: No discharge.         Left eye: No discharge.      Extraocular Movements: Extraocular movements intact.      Conjunctiva/sclera: Conjunctivae normal.      Pupils: Pupils are equal, round, and reactive to light.   Pulmonary:      Effort: Pulmonary effort is normal. No respiratory distress.      Breath sounds: No stridor.   Skin:     Coloration: Skin is not jaundiced or pale.      Findings: No erythema.    Neurological:      General: No focal deficit present.      Cranial Nerves: No cranial nerve deficit.      Motor: No weakness.      Coordination: Coordination normal.   Psychiatric:         Mood and Affect: Mood normal.         Speech: Speech normal.         Behavior: Behavior normal.     Neurological Exam  Mental Status   Speech is normal. Language is fluent with no aphasia. Attention and concentration are normal.  Patient is awake, not quite alert, fatigued.  Oriented to person, place and year and month, not day the month.  Otherwise follows commands and answers questions appropriately.  Thoughts are a bit tangential..    Cranial Nerves  CN II: Visual acuity is normal. Visual fields full to confrontation.  CN III, IV, VI: Extraocular movements intact bilaterally. Normal lids and orbits bilaterally. Pupils equal round and reactive to light bilaterally.  CN V: Facial sensation is normal.  CN VII: Full and symmetric facial movement.  CN VIII: Hearing is normal.  CN IX, X: Palate elevates symmetrically. Normal gag reflex.  CN XI: Shoulder shrug strength is normal.  CN XII: Tongue midline without atrophy or fasciculations.    Motor  Normal muscle bulk throughout.  Patient moves all 4 extremities equally without difficulty or drift.    Coordination  Right: Finger-to-nose normal.Left: Finger-to-nose normal.  No gross ataxia.        Lab Results: I have reviewed the following results:I have personally reviewed pertinent reports.    Recent Labs     10/17/24  1401   WBC 6.67   HGB 14.5   HCT 42.1   *   SODIUM 139   K 3.7      CO2 25   BUN 11   CREATININE 0.87   GLUC 91   MG 2.2     Imaging Results Review: I personally reviewed the following image studies in PACS and associated radiology reports: CT head. My interpretation of the radiology images/reports is: Unremarkable.  Other Study Results Review: No additional pertinent studies reviewed.    VTE Prophylaxis: VTE covered by:  apixaban, Oral, 5 mg at 10/17/24  1717        Administrative Statements   I have spent a total time of 25 minutes in caring for this patient on the day of the visit/encounter including Diagnostic results, Instructions for management, Impressions, Counseling / Coordination of care, Documenting in the medical record, Reviewing / ordering tests, medicine, procedures  , Obtaining or reviewing history  , and Communicating with other healthcare professionals .  VIRTUAL CARE DOCUMENTATION:     1. This service was provided via Telemedicine using [employer]     2. Parties in the room with patient during teleconsult Patient only    3. Confidentiality My office door was closed     4. Participants No one else was in the room    5. Patient acknowledged consent and understanding of privacy and security of the  Telemedicine consult. I informed the patient that I have reviewed their record in Epic and presented the opportunity for them to ask any questions regarding the visit today.  The patient agreed to participate.    6. I have spent a total time of 25 minutes in caring for this patient on the day of the visit/encounter including  outlined above .

## 2024-10-17 NOTE — H&P
H&P - Hospitalist   Name: Bobby Escobar 44 y.o. male I MRN: [id number]  Unit/Bed#: [facility] I Date of Admission: 10/17/2024   Date of Service: 10/17/2024 I Hospital Day: 0     Assessment & Plan  Paroxysmal atrial fibrillation (HCC)  Patient presents to ED via ambulance after loss of consciousness.  Found to be in A-fib by EMS.  Reports fatigue but no palpitations or chest pain.  Fluid bolus, magnesium, diltiazem bolus 15 mg given in ED. no known heart history or recreational drug use.  DDA1VE1-DEEm score of 0.    -Eliquis 5 mg ordered  -Metoprolol 25 mg twice daily ordered  -Echo ordered  -AM labs, including electrolytes  -Telemetry  -N.p.o. at midnight  -Cardiology consulted for possible LAMAR    Nonintractable epilepsy without status epilepticus (HCC)  Patient with known history of seizures. First occurring in 2005, with the most recent grand mal seizure being in 2012.  Reports episodes of urinary incontinence overnight that happen at baseline.  On Depakote 750 mg OD.     -Valproic acid level of 42, slightly low  -Continue home Depakote 750 mg daily  -Seizure precautions  -Neurology consulted  Thrombocytopenia (HCC)  Noted on admission labs   AM CBC with diff   Depression  Stable and chronic condition   Continue home zoloft   Monitor for symptoms during admission       VTE Pharmacologic Prophylaxis: VTE Score: 2  on Eliquis  Code Status: Level 1 - Full Code   Discussion with family: Patient declined call to .     Anticipated Length of Stay: Patient will be admitted on an observation basis with an anticipated length of stay of less than 2 midnights secondary to afib.    History of Present Illness   Chief Complaint: Loss of consciousness/seizure     Bobby Escobar is a 44 y.o. male with a PMH of seizures, depression who presents with loss of consciousness.  Patient states that this morning he woke up and noticed he had an episode of urinary incontinence in his sleep.  States that this will happen occasionally  "and has been worked up by outpatient providers with no explanation that he knows of.  His mother came into check on him at this time as she told him that he had made some noise. He noted at the time there were several things thrown on the ground around him. He went to get up and help his mother with her medicine. He ate food and took Tylenol because he feeling fatigued and \"hypoglycemic \". He sat down on the couch and that was the last thing he remembered.     He reports the next thing he remembers is waking up to the paramedics. He is unsure how long he was unconscious for.  He did wake up feeling groggy and confused which is normal for him after a postictal state, but unsure of how long it lasted.  He did not report any urinary or bowel incontinence with the syncopal episode.  He did not report any tongue biting that his awareness during this episode either. He does note before this episode of loss of consciousness that he checked his blood pressure and did not feel that it was low. Did not report an exact number. He stated the same thing about his pulse.     He reports his first seizure being in 2005, with the last grand mal seizure being in 2012. Since that time he has only had episodes of overnight urinary incontinence which he believes could be due to seizure activity. He cannot predict when these will happen. He does report that about a month ago his neurologist increased his Depakote to 750 mg daily. He stated that in the beginning he was only taking 500 mg but more recently has been compliant with the 750.  Reports a remote history of lithium use for his seizures as a child.    He reports feeling fatigued lately with rare palpitations. He attributed this to his change in dose and Zoloft which occurred in July. He denies any use of recreational drugs or any other changes that may have caused this. 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  The only other significant " event he can think of is another episode where he lost consciousness and fell off the couch a few months ago.  Denies headache, visual changes, chest pain, shortness of breath, nausea, vomiting, diarrhea.     Review of Systems   Constitutional:  Negative for fever.   HENT:  Negative for congestion.    Eyes:  Negative for visual disturbance.   Respiratory:  Negative for cough and shortness of breath.    Cardiovascular:  Negative for chest pain and palpitations.   Gastrointestinal:  Negative for nausea and vomiting.   Genitourinary:  Negative for dysuria.   Musculoskeletal:  Negative for gait problem.   Skin:  Negative for rash.   Neurological:  Positive for seizures and syncope.   Psychiatric/Behavioral:  Negative for confusion.        Historical Information   Past Medical History:   Diagnosis Date    Seizures (HCC)      History reviewed. No pertinent surgical history.  Social History     Tobacco Use    Smoking status: Never    Smokeless tobacco: Never   Vaping Use    Vaping status: Never Used   Substance and Sexual Activity    Alcohol use: Yes     Comment: social    Drug use: Never    Sexual activity: Not on file     E-Cigarette/Vaping    E-Cigarette Use Never User      E-Cigarette/Vaping Substances     History reviewed. No pertinent family history.  Social History:  Marital Status: Single   Occupation: Unknown  Patient Pre-hospital Living Situation: Home  Patient Pre-hospital Level of Mobility: walks  Patient Pre-hospital Diet Restrictions: None    Meds/Allergies   I have reviewed home medications with patient personally.  Prior to Admission medications    Medication Sig Start Date End Date Taking? Authorizing Provider   divalproex sodium (DEPAKOTE ER) 250 mg 24 hr tablet  11/15/19  Yes Historical Provider, MD   divalproex sodium (DEPAKOTE ER) 500 mg 24 hr tablet Take 500 mg by mouth daily   Yes Historical Provider, MD   sertraline (ZOLOFT) 50 mg tablet Take 50 mg by mouth daily 8/26/24 8/26/25 Yes Historical  Provider, MD   SUMAtriptan (IMITREX) 100 mg tablet Take 100 mg by mouth    Historical Provider, MD     Allergies   Allergen Reactions    Bactrim [Sulfamethoxazole-Trimethoprim]     Dilantin [Phenytoin]     Erythromycin Other (See Comments)    Hydromorphone Hives    Penicillins Other (See Comments)       Objective :  Temp:  [97.1 °F (36.2 °C)] 97.1 °F (36.2 °C)  HR:  [] 78  BP: ()/(53-84) 93/57  Resp:  [12-22] 12  SpO2:  [95 %-100 %] 98 %  O2 Device: None (Room air)    Physical Exam  Vitals and nursing note reviewed.   Constitutional:       General: He is not in acute distress.     Appearance: Normal appearance. He is not ill-appearing or toxic-appearing.   HENT:      Head: Normocephalic and atraumatic.      Nose: No congestion or rhinorrhea.      Mouth/Throat:      Mouth: Mucous membranes are moist.      Pharynx: No oropharyngeal exudate or posterior oropharyngeal erythema.   Eyes:      Extraocular Movements: Extraocular movements intact.      Conjunctiva/sclera: Conjunctivae normal.      Pupils: Pupils are equal, round, and reactive to light.   Cardiovascular:      Rate and Rhythm: Normal rate. Rhythm irregular.      Heart sounds: Normal heart sounds. No murmur heard.     No friction rub. No gallop.   Pulmonary:      Effort: Pulmonary effort is normal. No respiratory distress.      Breath sounds: Normal breath sounds. No wheezing, rhonchi or rales.   Musculoskeletal:      Right lower leg: No edema.      Left lower leg: No edema.   Skin:     General: Skin is warm and dry.      Findings: No rash.   Neurological:      General: No focal deficit present.      Mental Status: He is alert and oriented to person, place, and time.      GCS: GCS eye subscore is 4. GCS verbal subscore is 5. GCS motor subscore is 6.      Cranial Nerves: Cranial nerves 2-12 are intact. No cranial nerve deficit, dysarthria or facial asymmetry.      Sensory: Sensation is intact.      Motor: Motor function is intact. No weakness or  abnormal muscle tone.      Gait: Gait normal.   Psychiatric:         Mood and Affect: Mood normal.         Behavior: Behavior normal.         Thought Content: Thought content normal.           Lab Results: I have reviewed the following results:  Results from last 7 days   Lab Units 10/17/24  1401   WBC Thousand/uL 6.67   HEMOGLOBIN g/dL 14.5   HEMATOCRIT % 42.1   PLATELETS Thousands/uL 144*   SEGS PCT % 87*   LYMPHO PCT % 8*   MONO PCT % 5   EOS PCT % 0     Results from last 7 days   Lab Units 10/17/24  1401   SODIUM mmol/L 139   POTASSIUM mmol/L 3.7   CHLORIDE mmol/L 103   CO2 mmol/L 25   BUN mg/dL 11   CREATININE mg/dL 0.87   ANION GAP mmol/L 11   CALCIUM mg/dL 8.8   ALBUMIN g/dL 4.5   TOTAL BILIRUBIN mg/dL 0.60   ALK PHOS U/L 49   ALT U/L 17   AST U/L 16   GLUCOSE RANDOM mg/dL 91             Lab Results   Component Value Date    HGBA1C 5.0 09/13/2022    HGBA1C 5.0 06/19/2020           Imaging Results Review: I reviewed radiology reports from this admission including: chest xray and CT head.  Other Study Results Review: EKG was reviewed.     Kim Pavon DO   PGY-2 Rural  Residency   [facility]     ** Please Note: This note has been constructed using a voice recognition system. **

## 2024-10-17 NOTE — NURSING NOTE
Mr. Phelps is a 70 year old male with extensive PMH notable for pAF on amiodarone 200mg qd and apixaban 5mg bid, tachy-loan syndrome, HFpEF with most recent LVEF 65%, CAD s/p PCI 2006 on plavix, HTN, HLD, aortic atherosclerosis, secondary hyperparathyroidism, diverticulosis, COPD, GERD, hypothyroidism, gout, a history of tobacco use, obesity, ESRD s/p failed kidney transplant x2 (1992, 2005) on chronic immunosuppressive medication now on hemodialysis who presented to [facility] ED with chest pain. Per chart review, EMS activated and initial rhythm AF. Patient endorsed 2.5 hours of chest discomfort, with associated diaphoresis. Denies radiation. Of note, patient recently underwent coronary angiography in Feb 2024 and was found to have multivessel CAD. CTS evaluated and had declined patient for CABG and patient under PCI to the RCA on 02/28/2024. Mid LAD 70% + OM1/OM2 70% obstruction.     In the emergency department: ECG with inferolateral ST depressions. Initial troponin 0.035 --> 0.4 --> 1.9. BNP > 3000. BUN/Cr 29/5.8. WBC 6. Stable H/H at 8.2/27. Admitted to hospital medicine with cardiology consulted. He was started on ACS protocol at the recommendation of cardiology.     Overnight 04/09 rapid response activated for respiratory distress and flash pulmonary edema. Wife at bedside states he became acutely short of breath. Pt tripoding at bedside, satting in the 90s on nasal cannula, BP with systolic 170s. Pt does produce some urine still at baseline. Last dialysis was 3 days prior. History limited 2/2 patient respiratory distress. Patient transferred to the MICU for NIPPV and iHD.    Patient appears to be lakshmi on tele with pausing. Provider made aware, EKG obtained.

## 2024-10-17 NOTE — ED ATTENDING ATTESTATION
10/17/2024  I, Cleo Sauceda MD, saw and evaluated the patient. I have discussed the patient with the resident/non-physician practitioner and agree with the resident's/non-physician practitioner's findings, Plan of Care, and MDM as documented in the resident's/non-physician practitioner's note, except where noted. All available labs and Radiology studies were reviewed.  I was present for key portions of any procedure(s) performed by the resident/non-physician practitioner and I was immediately available to provide assistance.       At this point I agree with the current assessment done in the Emergency Department.  I have conducted an independent evaluation of this patient a history and physical is as follows:    44-year-old male with history of epilepsy on Depakote presenting for evaluation of possible seizure versus syncope.  Patient states he has been feeling fatigued over the past few weeks.  He states he believes he had a seizure this morning.  His mother called for EMS.  EMS states patient's mother said he had 2 episodes of being unresponsive this morning.  It is unclear if he had any generalized tonic-clonic activity.  He did not appear to have any postictal phase per EMS.  Patient states he feels slightly lightheaded.  He denies any chest pain or shortness of breath.  Denies any dyspnea on exertion.  He states he feels slightly nauseous but denies vomiting, diarrhea, or abdominal pain.  Denies urinary symptoms.  Denies leg pain or leg swelling.    Physical exam:  Vital signs reviewed, tachycardic, vitals otherwise normal.   Patient is awake and alert, no acute distress, head normocephalic, atraumatic, mucous membranes moist, neck supple, heart tachycardic rate, irregular rate, no murmurs/rubs/gallops, lungs clear to auscultation bilaterally, abdomen soft, non tender, non distended, no rebound or guarding, no peripheral edema, no skin rashes, strength 5/5 in bilateral upper and lower extremities, no  sensory deficits, cranial nerves II through XII intact, normal coordination.    Assessment/plan:  44-year-old male with history of epilepsy on Depakote presenting for evaluation of possible seizure versus syncope.    Differential diagnosis includes: Syncope, seizure, PE, anemia, electrolyte abnormality, dehydration, thyroid dysfunction.  Will obtain labs, EKG, give fluids and reassess.    ED Course     Reviewed labs, no marked abnormalities.  Will treat with diltiazem for A-fib with RVR.  Will plan for admission for new onset A-fib with RVR.  Patient agreeable with plan for admission. Discussed with medicine for admission.    Critical Care Time  ECG 12 Lead Documentation Only    Date/Time: 10/17/2024 3:07 PM    Performed by: Cleo Sauceda MD  Authorized by: Cleo Sauceda MD    Indications / Diagnosis:  Tachycardia  ECG reviewed by me, the ED Provider: yes    Patient location:  ED  Previous ECG:     Previous ECG:  Compared to current    Similarity:  Changes noted    Comparison to cardiac monitor: Yes    Interpretation:     Interpretation: non-specific    Rate:     ECG rate:  142    ECG rate assessment: tachycardic    Rhythm:     Rhythm: atrial fibrillation    Ectopy:     Ectopy: none    QRS:     QRS axis:  Normal    QRS intervals:  Normal  Conduction:     Conduction: normal    ST segments:     ST segments:  Normal  T waves:     T waves: normal    CriticalCare Time    Date/Time: 10/17/2024 3:07 PM    Performed by: Cleo Sauceda MD  Authorized by: Cleo Sauceda MD    Critical care provider statement:     Critical care time (minutes):  34    Critical care start time:  10/17/2024 1:57 PM    Critical care end time:  10/17/2024 3:07 PM    Critical care time was exclusive of:  Separately billable procedures and treating other patients and teaching time    Critical care was necessary to treat or prevent imminent or life-threatening deterioration of the following conditions:  Circulatory failure    Critical care  was time spent personally by me on the following activities:  Blood draw for specimens, obtaining history from patient or surrogate, development of treatment plan with patient or surrogate, discussions with consultants, evaluation of patient's response to treatment, examination of patient, review of old charts, re-evaluation of patient's condition, ordering and review of radiographic studies, ordering and review of laboratory studies and ordering and performing treatments and interventions    I assumed direction of critical care for this patient from another provider in my specialty: no

## 2024-10-17 NOTE — ASSESSMENT & PLAN NOTE
Patient with known history of seizures. First occurring in 2005, with the most recent grand mal seizure being in 2012.  Reports episodes of urinary incontinence overnight that happen at baseline.  On Depakote 750 mg OD.     -Valproic acid level of 42, slightly low  -Continue home Depakote 750 mg daily  -Seizure precautions  -Neurology consulted

## 2024-10-17 NOTE — ED PROVIDER NOTES
Time reflects when diagnosis was documented in both MDM as applicable and the Disposition within this note       Time User Action Codes Description Comment    10/17/2024  2:01 PM Anne Marie Damico [I48.91] New onset atrial fibrillation (HCC)     10/17/2024  2:01 PM Anne Marie Damico Add [I48.91] Atrial fibrillation with RVR (HCC)     10/17/2024  2:09 PM Anne Marie Damico [G40.909] Nonintractable epilepsy without status epilepticus (HCC)           ED Disposition       ED Disposition   Admit    Condition   Stable    Date/Time   Thu Oct 17, 2024  3:11 PM    Comment   Case was discussed with Dr. Zaid Henson and the patient's admission status was agreed to be Admission Status: observation status to the service of Dr. Henson .  Tele OBS               Assessment & Plan       Medical Decision Making  44 year old man with epilepsy presents to ED for unresponsive episode, found to have atrial fibrillation in RVR. This is a new onset of afib for him and no history of cardiac disease. Differential diagnosis include MI, PE, infection, drug use, excess depakote level, breakthrough seizure activity. EKG on admission and from EMS showed afib with RVR. There is no ST/T wave changes. However will obtain troponin level and repeat. Will also obtain CBC, CMP, magnesium, BNP, D-dimer, UDS. Will treat with LR bolus and magnesium. Anticipate patient would need inpatient workup for new onset afib and cardiac workup vs. Neurologic workup for possible breakthrough seizure.     Amount and/or Complexity of Data Reviewed  Labs: ordered. Decision-making details documented in ED Course.  Radiology: ordered.    Risk  Prescription drug management.  Decision regarding hospitalization.        ED Course as of 10/17/24 1516   Thu Oct 17, 2024   1400 Patient is found in atrial fibrillation with RVR - this was confirmed from EKG strip obtained by EMS. He continues to be in afib with RVR in the ED on cardiac monitor. Heart rate 100-150s currently   1440  BNP: 49   1441 D-Dimer, Quant: <0.27   1441 hs TnI 0hr: 6   1441 CXR without acute findings. No cardiomegaly or pleural effusion.    1441 Discussed with Dr. Sauceda. Plan to start diltiazem injection and then diltiazem infusion.   Etiology of afib with RVR not consistent with MI, infection. BNP normal, CHF exacerbation not likely. Troponin is normal. Pending repeat.    1451 After he received diltiazem 15mg IV push, heart rate decreased to . Patient is still in atrial fibrillation. He reports feeling slightly better.    1501 Blood Pressure: 96/58   1501 Repeat EKG showed atrial fibrillation with ventricular rate 64. Blood pressure improved to 96/58.    1513 Diltiazem drip order has been cancelled. Patient never received any diltiazem drip as of this time.        Medications   magnesium sulfate 2 g/50 mL IVPB (premix) 2 g (2 g Intravenous New Bag 10/17/24 1402)   lactated ringers bolus 1,000 mL (1,000 mL Intravenous New Bag 10/17/24 1456)   metoprolol tartrate (LOPRESSOR) tablet 25 mg (has no administration in time range)   ondansetron (FOR EMS ONLY) (ZOFRAN) 4 mg/2 mL injection 4 mg (0 mg Does not apply Given to EMS 10/17/24 1333)   lactated ringers bolus 1,000 mL (0 mL Intravenous Stopped 10/17/24 1501)   diltiazem (CARDIZEM) injection 15 mg (15 mg Intravenous Given 10/17/24 1444)       ED Risk Strat Scores                           SBIRT 20yo+      Flowsheet Row Most Recent Value   Initial Alcohol Screen: US AUDIT-C     1. How often do you have a drink containing alcohol? 0 Filed at: 10/17/2024 1327   2. How many drinks containing alcohol do you have on a typical day you are drinking?  0 Filed at: 10/17/2024 1327   3a. Male UNDER 65: How often do you have five or more drinks on one occasion? 0 Filed at: 10/17/2024 1327   3b. FEMALE Any Age, or MALE 65+: How often do you have 4 or more drinks on one occassion? 0 Filed at: 10/17/2024 1327   Audit-C Score 0 Filed at: 10/17/2024 1327   MILAGROS: How many times in the  past year have you...    Used an illegal drug or used a prescription medication for non-medical reasons? Never Filed at: 10/17/2024 7976                            History of Present Illness       Chief Complaint   Patient presents with    Weakness - Generalized     Patient brought for weakness for a few weeks. Mom called ems today for two unresponsive episodes both witnessed. Hx of seizures. Ems also reports HR  and possibly afib, no hx of afib. Zofran and fluids given by ems       Past Medical History:   Diagnosis Date    Seizures (HCC)       History reviewed. No pertinent surgical history.   History reviewed. No pertinent family history.   Social History     Tobacco Use    Smoking status: Never    Smokeless tobacco: Never   Vaping Use    Vaping status: Never Used   Substance Use Topics    Alcohol use: Yes     Comment: social    Drug use: Never      E-Cigarette/Vaping    E-Cigarette Use Never User       E-Cigarette/Vaping Substances      I have reviewed and agree with the history as documented.     44 year old man with epilepsy presents to ED for unresponsive episode. He has been feeling weak and fatigued for the past few weeks. This morning, he woke up in bed and noticed he accidentally urinated on himself. He then had a strange sensation, described as feeling of absent-minded. He ate breakfast and then went to the couch. Per mom and EMS report, patient lost consciousness at this time. Patient states he has no memory of the LOC episode. EMS found him laying prone by the couch. En route to the ED, they did not witness another LOC episode. Patient denies ever having cardiac history. Denies alcohol and drug use.     For medication, he takes zoloft 50mg daily and this was increased in July from 25mg daily. He takes depakote 750mg daily for his seizure. Last seizure that he can recall was 2012. OTC medication include vitamin, coenzyme Q10, magnesium, and vitamin B12.     Denies chest pain, SOB, palpitation,  cough, abdominal pain, vomiting. +nausea.           Review of Systems   Constitutional:  Negative for activity change and fever.   HENT:  Negative for hearing loss and sore throat.    Eyes:  Negative for discharge and visual disturbance.   Respiratory:  Negative for cough and shortness of breath.    Cardiovascular:  Negative for chest pain and palpitations.   Gastrointestinal:  Negative for abdominal pain and nausea.   Genitourinary:  Negative for difficulty urinating and dysuria.   Musculoskeletal:  Negative for arthralgias and back pain.   Skin:  Negative for color change and rash.   Neurological:  Positive for seizures and syncope. Negative for dizziness, facial asymmetry, speech difficulty, weakness, light-headedness and headaches.           Objective       ED Triage Vitals [10/17/24 1327]   Temperature Pulse Blood Pressure Respirations SpO2 Patient Position - Orthostatic VS   (!) 97.1 °F (36.2 °C) (!) 126 127/84 18 99 % Lying      Temp Source Heart Rate Source BP Location FiO2 (%) Pain Score    Temporal Monitor Left arm -- No Pain      Vitals      Date and Time Temp Pulse SpO2 Resp BP Pain Score FACES Pain Rating User   10/17/24 1500 -- 78 100 % 16 96/58 -- -- CK   10/17/24 1450 -- 81 -- 18 83/55 -- -- CK   10/17/24 1445 -- 136 98 % 14 92/64 -- -- CK   10/17/24 1430 -- 144 95 % 12 94/53 -- -- CK   10/17/24 1415 -- 142 96 % 19 98/59 -- -- CK   10/17/24 1400 -- 139 99 % 22 99/57 -- -- CK   10/17/24 1345 -- 144 98 % 21 109/67 -- -- CK   10/17/24 1340 -- -- -- -- -- No Pain -- CK   10/17/24 1330 -- 148 99 % 15 105/67 -- -- CK   10/17/24 1327 97.1 °F (36.2 °C) 126 99 % 18 127/84 No Pain -- CLS            Physical Exam  Vitals reviewed.   Constitutional:       General: He is not in acute distress.     Appearance: He is well-developed.   HENT:      Head: Normocephalic and atraumatic.      Nose: Nose normal.   Eyes:      Extraocular Movements: Extraocular movements intact.      Conjunctiva/sclera: Conjunctivae normal.    Cardiovascular:      Rate and Rhythm: Tachycardia present. Rhythm irregular.      Heart sounds: No murmur heard.  Pulmonary:      Effort: Pulmonary effort is normal. No respiratory distress.      Breath sounds: Normal breath sounds. No stridor. No wheezing, rhonchi or rales.   Abdominal:      General: Abdomen is flat. Bowel sounds are normal. There is no distension.      Palpations: Abdomen is soft. There is no mass.      Tenderness: There is no abdominal tenderness.      Hernia: No hernia is present.   Musculoskeletal:      Cervical back: Neck supple.      Right lower leg: No edema.      Left lower leg: No edema.   Skin:     General: Skin is warm and dry.      Comments: Superficial abrasion at right chin   Neurological:      General: No focal deficit present.      Mental Status: He is alert. Mental status is at baseline.      Cranial Nerves: Cranial nerves 2-12 are intact. No cranial nerve deficit, dysarthria or facial asymmetry.      Sensory: Sensation is intact. No sensory deficit.      Motor: Motor function is intact. No weakness, tremor, abnormal muscle tone or seizure activity.      Comments: No tongue laceration   Psychiatric:         Mood and Affect: Mood normal.         Behavior: Behavior normal.         Results Reviewed       Procedure Component Value Units Date/Time    COVID only [[id number]]     Lab Status: No result Specimen: Nares from Nose     TSH, 3rd generation with Free T4 reflex [[id number]]  (Normal) Collected: 10/17/24 1401    Lab Status: Final result Specimen: Blood from Arm, Left Updated: 10/17/24 1443     TSH 3RD GENERATON 1.210 uIU/mL     B-Type Natriuretic Peptide(BNP) [[id number]]  (Normal) Collected: 10/17/24 1401    Lab Status: Final result Specimen: Blood Updated: 10/17/24 1435     BNP 49 pg/mL     HS Troponin 0hr (reflex protocol) [[id number]]  (Normal) Collected: 10/17/24 1401    Lab Status: Final result Specimen: Blood from Arm, Left Updated: 10/17/24 1434     hs TnI 0hr 6 ng/L     HS  Troponin I 2hr [[id number]]     Lab Status: No result Specimen: Blood     CBC and differential [[id number]]  (Abnormal) Collected: 10/17/24 1401    Lab Status: Final result Specimen: Blood from Arm, Left Updated: 10/17/24 1430     WBC 6.67 Thousand/uL      RBC 4.85 Million/uL      Hemoglobin 14.5 g/dL      Hematocrit 42.1 %      MCV 87 fL      MCH 29.9 pg      MCHC 34.4 g/dL      RDW 11.9 %      MPV 9.0 fL      Platelets 144 Thousands/uL      nRBC 0 /100 WBCs      Segmented % 87 %      Immature Grans % 0 %      Lymphocytes % 8 %      Monocytes % 5 %      Eosinophils Relative 0 %      Basophils Relative 0 %      Absolute Neutrophils 5.80 Thousands/µL      Absolute Immature Grans 0.02 Thousand/uL      Absolute Lymphocytes 0.51 Thousands/µL      Absolute Monocytes 0.32 Thousand/µL      Eosinophils Absolute 0.01 Thousand/µL      Basophils Absolute 0.01 Thousands/µL     Comprehensive metabolic panel [[id number]] Collected: 10/17/24 1401    Lab Status: Final result Specimen: Blood from Arm, Left Updated: 10/17/24 1430     Sodium 139 mmol/L      Potassium 3.7 mmol/L      Chloride 103 mmol/L      CO2 25 mmol/L      ANION GAP 11 mmol/L      BUN 11 mg/dL      Creatinine 0.87 mg/dL      Glucose 91 mg/dL      Calcium 8.8 mg/dL      AST 16 U/L      ALT 17 U/L      Alkaline Phosphatase 49 U/L      Total Protein 6.5 g/dL      Albumin 4.5 g/dL      Total Bilirubin 0.60 mg/dL      eGFR 105 ml/min/1.73sq m     Narrative:      [employer] guidelines for Chronic Kidney Disease (CKD):     Stage 1 with normal or high GFR (GFR > 90 mL/min/1.73 square meters)    Stage 2 Mild CKD (GFR = 60-89 mL/min/1.73 square meters)    Stage 3A Moderate CKD (GFR = 45-59 mL/min/1.73 square meters)    Stage 3B Moderate CKD (GFR = 30-44 mL/min/1.73 square meters)    Stage 4 Severe CKD (GFR = 15-29 mL/min/1.73 square meters)    Stage 5 End Stage CKD (GFR <15 mL/min/1.73 square meters)  Note: GFR calculation is accurate only with a steady  state creatinine    Magnesium [[id number]]  (Normal) Collected: 10/17/24 1401    Lab Status: Final result Specimen: Blood from Arm, Left Updated: 10/17/24 1430     Magnesium 2.2 mg/dL     Valproic acid level, total [[id number]]  (Abnormal) Collected: 10/17/24 1401    Lab Status: Final result Specimen: Blood from Arm, Left Updated: 10/17/24 1430     Valproic Acid, Total 42 ug/mL     D-dimer, quantitative [[id number]]  (Normal) Collected: 10/17/24 1401    Lab Status: Final result Specimen: Blood from Arm, Left Updated: 10/17/24 1428     D-Dimer, Quant <0.27 ug/ml FEU     Rapid drug screen, urine [[id number]]     Lab Status: No result Specimen: Urine             XR chest 1 view portable    (Results Pending)   CT head wo contrast    (Results Pending)       ECG 12 Lead Documentation Only    Date/Time: 10/17/2024 2:11 PM    Performed by: Anne Marie Damico MD  Authorized by: Anne Marie Damico MD    Indications / Diagnosis:  Afib with rvr  ECG reviewed by me, the ED Provider: yes    Patient location:  ED  Previous ECG:     Previous ECG:  Unavailable    Comparison to cardiac monitor: Yes    Interpretation:     Interpretation: abnormal    Rate:     ECG rate:  142  Rhythm:     Rhythm: atrial fibrillation    Ectopy:     Ectopy: none    QRS:     QRS axis:  Normal  Conduction:     Conduction: normal    ST segments:     ST segments:  Normal  T waves:     T waves: normal    Comments:      Atrial fibrillation with rapid ventricular response  Ventricular rate 142, Qtc 486      ED Medication and Procedure Management   Prior to Admission Medications   Prescriptions Last Dose Informant Patient Reported? Taking?   SUMAtriptan (IMITREX) 100 mg tablet   Yes No   Sig: Take 100 mg by mouth   divalproex sodium (DEPAKOTE ER) 250 mg 24 hr tablet 10/17/2024  Yes Yes   divalproex sodium (DEPAKOTE ER) 500 mg 24 hr tablet 10/17/2024  Yes Yes   Sig: Take 500 mg by mouth daily   sertraline (ZOLOFT) 50 mg tablet 10/16/2024  Yes Yes   Sig: Take 50 mg by mouth  daily      Facility-Administered Medications: None     Patient's Medications   Discharge Prescriptions    No medications on file     No discharge procedures on file.  ED SEPSIS DOCUMENTATION   Time reflects when diagnosis was documented in both MDM as applicable and the Disposition within this note       Time User Action Codes Description Comment    10/17/2024  2:01 PM Anne Marie Damico [I48.91] New onset atrial fibrillation (HCC)     10/17/2024  2:01 PM Anne Marie Damico [I48.91] Atrial fibrillation with RVR (HCC)     10/17/2024  2:09 PM Anne Marie Damico [G40.909] Nonintractable epilepsy without status epilepticus (HCC)                  Anne Marie Damico MD  10/17/24 1806

## 2024-10-17 NOTE — PLAN OF CARE
Problem: NEUROSENSORY - ADULT  Goal: Achieves stable or improved neurological status  Description: INTERVENTIONS  - Monitor and report changes in neurological status  - Monitor vital signs such as temperature, blood pressure, glucose, and any other labs ordered   - Initiate measures to prevent increased intracranial pressure  - Monitor for seizure activity and implement precautions if appropriate      Outcome: Progressing  Goal: Remains free of injury related to seizures activity  Description: INTERVENTIONS  - Maintain airway, patient safety  and administer oxygen as ordered  - Monitor patient for seizure activity, document and report duration and description of seizure to physician/advanced practitioner  - If seizure occurs,  ensure patient safety during seizure  - Reorient patient post seizure  - Seizure pads on all 4 side rails  - Instruct patient/family to notify RN of any seizure activity including if an aura is experienced  - Instruct patient/family to call for assistance with activity based on nursing assessment  - Administer anti-seizure medications if ordered    Outcome: Progressing  Goal: Achieves maximal functionality and self care  Description: INTERVENTIONS  - Monitor swallowing and airway patency with patient fatigue and changes in neurological status  - Encourage and assist patient to increase activity and self care.   - Encourage visually impaired, hearing impaired and aphasic patients to use assistive/communication devices  Outcome: Progressing     Problem: CARDIOVASCULAR - ADULT  Goal: Maintains optimal cardiac output and hemodynamic stability  Description: INTERVENTIONS:  - Monitor I/O, vital signs and rhythm  - Monitor for S/S and trends of decreased cardiac output  - Administer and titrate ordered vasoactive medications to optimize hemodynamic stability  - Assess quality of pulses, skin color and temperature  - Assess for signs of decreased coronary artery perfusion  - Instruct patient to  report change in severity of symptoms  Outcome: Progressing  Goal: Absence of cardiac dysrhythmias or at baseline rhythm  Description: INTERVENTIONS:  - Continuous cardiac monitoring, vital signs, obtain 12 lead EKG if ordered  - Administer antiarrhythmic and heart rate control medications as ordered  - Monitor electrolytes and administer replacement therapy as ordered  Outcome: Progressing     Problem: MUSCULOSKELETAL - ADULT  Goal: Maintain or return mobility to safest level of function  Description: INTERVENTIONS:  - Assess patient's ability to carry out ADLs; assess patient's baseline for ADL function and identify physical deficits which impact ability to perform ADLs (bathing, care of mouth/teeth, toileting, grooming, dressing, etc.)  - Assess/evaluate cause of self-care deficits   - Assess range of motion  - Assess patient's mobility  - Assess patient's need for assistive devices and provide as appropriate  - Encourage maximum independence but intervene and supervise when necessary  - Involve family in performance of ADLs  - Assess for home care needs following discharge   - Consider OT consult to assist with ADL evaluation and planning for discharge  - Provide patient education as appropriate  Outcome: Progressing

## 2024-10-17 NOTE — ASSESSMENT & PLAN NOTE
Patient presents to ED via ambulance after loss of consciousness.  Found to be in A-fib by EMS.  Reports fatigue but no palpitations or chest pain.  Fluid bolus, magnesium, diltiazem bolus 15 mg given in ED. no known heart history or recreational drug use.  [id number] score of 0.    -Eliquis 5 mg ordered  -Metoprolol 25 mg twice daily ordered  -Echo ordered  -AM labs, including electrolytes  -Telemetry  -N.p.o. at midnight  -Cardiology consulted for possible LAMAR

## 2024-10-17 NOTE — ASSESSMENT & PLAN NOTE
Breakthrough seizure, no clear provoking etiology.  - Suspect A-fib/arrhythmia may have been provoked by seizure.  Recommend further workup by medicine and/or cardiology to exclude other possible causes.  - Will increase home VPA dose from 750 to 1000 mg daily.  - Most recent EEG done by outside hospital was normal.  - Patient reports that his neurologist is attempting to set up an ambulatory study which I think would be helpful.  - No clear need for any additional neuroimaging or workup at this time.  - Recommend 24-hour seizure-free period prior to discharge.  - Please call questions/concerns

## 2024-10-18 ENCOUNTER — APPOINTMENT (OUTPATIENT)
Dept: NON INVASIVE DIAGNOSTICS | Facility: HOSPITAL | Age: 44
End: 2024-10-18
Payer: COMMERCIAL

## 2024-10-18 PROBLEM — I48.91 NEW ONSET A-FIB (HCC): Status: ACTIVE | Noted: 2024-10-17

## 2024-10-18 LAB
ALBUMIN SERPL BCG-MCNC: 4.1 G/DL (ref 3.5–5)
ALP SERPL-CCNC: 45 U/L (ref 34–104)
ALT SERPL W P-5'-P-CCNC: 14 U/L (ref 7–52)
ANION GAP SERPL CALCULATED.3IONS-SCNC: 6 MMOL/L (ref 4–13)
AORTIC ROOT: 3.3 CM
AST SERPL W P-5'-P-CCNC: 15 U/L (ref 13–39)
BASOPHILS # BLD AUTO: 0.02 THOUSANDS/ΜL (ref 0–0.1)
BASOPHILS NFR BLD AUTO: 0 % (ref 0–1)
BILIRUB SERPL-MCNC: 0.67 MG/DL (ref 0.2–1)
BSA FOR ECHO PROCEDURE: 1.93 M2
BUN SERPL-MCNC: 12 MG/DL (ref 5–25)
CALCIUM SERPL-MCNC: 8.6 MG/DL (ref 8.4–10.2)
CHLORIDE SERPL-SCNC: 103 MMOL/L (ref 96–108)
CO2 SERPL-SCNC: 30 MMOL/L (ref 21–32)
CREAT SERPL-MCNC: 0.91 MG/DL (ref 0.6–1.3)
E WAVE DECELERATION TIME: 229 MS
E/A RATIO: 2.03
EOSINOPHIL # BLD AUTO: 0.04 THOUSAND/ΜL (ref 0–0.61)
EOSINOPHIL NFR BLD AUTO: 1 % (ref 0–6)
ERYTHROCYTE [DISTWIDTH] IN BLOOD BY AUTOMATED COUNT: 12.2 % (ref 11.6–15.1)
FRACTIONAL SHORTENING: 30 (ref 28–44)
GFR SERPL CREATININE-BSD FRML MDRD: 102 ML/MIN/1.73SQ M
GLUCOSE SERPL-MCNC: 90 MG/DL (ref 65–140)
HCT VFR BLD AUTO: 40.6 % (ref 36.5–49.3)
HGB BLD-MCNC: 13.6 G/DL (ref 12–17)
IMM GRANULOCYTES # BLD AUTO: 0.02 THOUSAND/UL (ref 0–0.2)
IMM GRANULOCYTES NFR BLD AUTO: 0 % (ref 0–2)
INTERVENTRICULAR SEPTUM IN DIASTOLE (PARASTERNAL SHORT AXIS VIEW): 1.1 CM
INTERVENTRICULAR SEPTUM: 1.1 CM (ref 0.6–1.1)
LAAS-AP2: 31.6 CM2
LAAS-AP4: 24.9 CM2
LEFT ATRIUM SIZE: 3.1 CM
LEFT ATRIUM VOLUME (MOD BIPLANE): 106 ML
LEFT ATRIUM VOLUME INDEX (MOD BIPLANE): 54.9 ML/M2
LEFT INTERNAL DIMENSION IN SYSTOLE: 2.6 CM (ref 2.1–4)
LEFT VENTRICULAR INTERNAL DIMENSION IN DIASTOLE: 3.7 CM (ref 3.5–6)
LEFT VENTRICULAR POSTERIOR WALL IN END DIASTOLE: 0.9 CM
LEFT VENTRICULAR STROKE VOLUME: 34 ML
LVSV (TEICH): 34 ML
LYMPHOCYTES # BLD AUTO: 2.2 THOUSANDS/ΜL (ref 0.6–4.47)
LYMPHOCYTES NFR BLD AUTO: 35 % (ref 14–44)
MCH RBC QN AUTO: 29.6 PG (ref 26.8–34.3)
MCHC RBC AUTO-ENTMCNC: 33.5 G/DL (ref 31.4–37.4)
MCV RBC AUTO: 88 FL (ref 82–98)
MONOCYTES # BLD AUTO: 0.52 THOUSAND/ΜL (ref 0.17–1.22)
MONOCYTES NFR BLD AUTO: 8 % (ref 4–12)
MV E'TISSUE VEL-LAT: 16 CM/S
MV E'TISSUE VEL-SEP: 13 CM/S
MV PEAK A VEL: 0.37 M/S
MV PEAK E VEL: 75 CM/S
MV STENOSIS PRESSURE HALF TIME: 67 MS
MV VALVE AREA P 1/2 METHOD: 3.28
NEUTROPHILS # BLD AUTO: 3.5 THOUSANDS/ΜL (ref 1.85–7.62)
NEUTS SEG NFR BLD AUTO: 56 % (ref 43–75)
NRBC BLD AUTO-RTO: 0 /100 WBCS
PLATELET # BLD AUTO: 147 THOUSANDS/UL (ref 149–390)
PMV BLD AUTO: 9.2 FL (ref 8.9–12.7)
POTASSIUM SERPL-SCNC: 3.7 MMOL/L (ref 3.5–5.3)
PROT SERPL-MCNC: 5.8 G/DL (ref 6.4–8.4)
PULM VEIN S/D RATIO: 1.67
PV PEAK D VEL: 0.12 M/S
PV PEAK S VEL: 0.2 M/S
RBC # BLD AUTO: 4.6 MILLION/UL (ref 3.88–5.62)
RIGHT ATRIAL 2D VOLUME: 42 ML
RIGHT ATRIUM AREA SYSTOLE A4C: 16.1 CM2
RIGHT VENTRICLE ID DIMENSION: 2.8 CM
SL CV LEFT ATRIUM LENGTH A2C: 6.1 CM
SL CV LV EF: 60
SL CV PED ECHO LEFT VENTRICLE DIASTOLIC VOLUME (MOD BIPLANE) 2D: 57 ML
SL CV PED ECHO LEFT VENTRICLE SYSTOLIC VOLUME (MOD BIPLANE) 2D: 24 ML
SODIUM SERPL-SCNC: 139 MMOL/L (ref 135–147)
TRICUSPID ANNULAR PLANE SYSTOLIC EXCURSION: 2.6 CM
TRICUSPID VALVE PEAK E WAVE VELOCITY: 0.09 M/S
WBC # BLD AUTO: 6.3 THOUSAND/UL (ref 4.31–10.16)

## 2024-10-18 PROCEDURE — 93306 TTE W/DOPPLER COMPLETE: CPT | Performed by: INTERNAL MEDICINE

## 2024-10-18 PROCEDURE — 80053 COMPREHEN METABOLIC PANEL: CPT | Performed by: STUDENT IN AN ORGANIZED HEALTH CARE EDUCATION/TRAINING PROGRAM

## 2024-10-18 PROCEDURE — 85025 COMPLETE CBC W/AUTO DIFF WBC: CPT | Performed by: STUDENT IN AN ORGANIZED HEALTH CARE EDUCATION/TRAINING PROGRAM

## 2024-10-18 PROCEDURE — 93306 TTE W/DOPPLER COMPLETE: CPT

## 2024-10-18 PROCEDURE — 99232 SBSQ HOSP IP/OBS MODERATE 35: CPT | Performed by: FAMILY MEDICINE

## 2024-10-18 RX ORDER — DIVALPROEX SODIUM 250 MG/1
1000 TABLET, FILM COATED, EXTENDED RELEASE ORAL DAILY
Status: DISCONTINUED | OUTPATIENT
Start: 2024-10-18 | End: 2024-10-19 | Stop reason: HOSPADM

## 2024-10-18 RX ADMIN — SERTRALINE HYDROCHLORIDE 50 MG: 50 TABLET ORAL at 08:45

## 2024-10-18 RX ADMIN — DIVALPROEX SODIUM 1000 MG: 250 TABLET, FILM COATED, EXTENDED RELEASE ORAL at 08:44

## 2024-10-18 RX ADMIN — SODIUM CHLORIDE 250 ML: 0.9 INJECTION, SOLUTION INTRAVENOUS at 04:14

## 2024-10-18 RX ADMIN — ASPIRIN 81 MG: 81 TABLET, COATED ORAL at 08:45

## 2024-10-18 NOTE — ASSESSMENT & PLAN NOTE
Patient presents to ED via ambulance after loss of consciousness.  Found to be in A-fib by EMS.  Reports fatigue but no palpitations or chest pain.  Fluid bolus, magnesium, diltiazem bolus 15 mg given in ED. no known heart history or recreational drug use.  [id number] score of 0.    Patient converted to sinus lakshmi last night  DC eliquis and metoprolol, start asa 81mg  Follow up 2D echo

## 2024-10-18 NOTE — UTILIZATION REVIEW
Initial Clinical Review    Admission: Date/Time/Statement:   Admission Orders (From admission, onward)       Ordered        10/17/24 1512  Place in Observation  Once                          Orders Placed This Encounter   Procedures    Place in Observation     Standing Status:   Standing     Number of Occurrences:   1     Order Specific Question:   Level of Care     Answer:   Med Surg [16]     ED Arrival Information       Expected   -    Arrival   10/17/2024 13:24    Acuity   Emergent              Means of arrival   Ambulance    Escorted by   [facility]    Service   Hospitalist    Admission type   Emergency              Arrival complaint   seizure             Chief Complaint   Patient presents with    Weakness - Generalized     Patient brought for weakness for a few weeks. Mom called ems today for two unresponsive episodes both witnessed. Hx of seizures. Ems also reports HR  and possibly afib, no hx of afib. Zofran and fluids given by ems       Initial Presentation: 44 y.o. male presents to ed from home via ems for evaluation and treatment of two seizure like events with loss of consciousness with tachycardia. Prior to PMHX: seizures on depakote daily, NO history of Afib.  Clinical assessment significant for fatigue, no palpitations of chest pain, tachycardia.  Valproic acid level low. In ed received iv fluid bolus, Mag and cardizem bolus.  Admit to observation for new diagnosis Afib and seizure evaluation.   Plan includes Echo, metoprolol po 25 mg bid, telemetry, cardiology consult for possible LAMAR, npo mn, neuro consult    Anticipated Length of Stay/Certification Statement: : Patient will be admitted on an observation basis with an anticipated length of stay of less than 2 midnights secondary to afib.     Date: 10-18-24    Day 2: observation  Certification Statement: The patient will continue to require additional inpatient hospital stay due to Seizure  Discharge Plan: Anticipate discharge  tomorrow to home.    Patient converted to sinus lakshmi last night. Dc eliquis and metoprolol and start asa. Follow up echo.  Depakote dosage increased to 1000 mg bid.       ED Treatment-Medication Administration from 10/17/2024 1324 to 10/17/2024 1551         Date/Time Order Dose Route Action     10/17/2024 1333 ondansetron (FOR EMS ONLY) (ZOFRAN) 4 mg/2 mL injection 4 mg   Given to EMS     10/17/2024 1402 lactated ringers bolus 1,000 mL 1,000 mL Intravenous New Bag     10/17/2024 1402 magnesium sulfate 2 g/50 mL IVPB (premix) 2 g 2 g Intravenous New Bag     10/17/2024 1444 diltiazem (CARDIZEM) injection 15 mg 15 mg Intravenous Given     10/17/2024 1456 lactated ringers bolus 1,000 mL 1,000 mL Intravenous New Bag            Scheduled Medications:  aspirin, 81 mg, Oral, Daily  divalproex sodium, 1,000 mg, Oral, Daily  metoprolol tartrate, 12.5 mg, Oral, Q12H ROOSEVELT  sertraline, 50 mg, Oral, Daily      Continuous IV Infusions:     PRN Meds:  acetaminophen, 650 mg, Oral, Q6H PRN  ondansetron, 4 mg, Intravenous, Q6H PRN      ED Triage Vitals [10/17/24 1327]   Temperature Pulse Respirations Blood Pressure SpO2 Pain Score   (!) 97.1 °F   (36.2 °C) (!) 126 18 127/84 99 % No Pain     Weight (last 2 days)       Date/Time Weight    10/18/24 08:12:30 81 (178.57)    10/17/24 16:28:38 81 (178.57)    10/17/24 1327 83.3 (183.64)            Vital Signs (last 3 days)       Date/Time Temp Pulse Resp BP MAP  SpO2 O2 Device [place] Coma Scale Score Pain    10/18/24 08:12:30 98.3 °F (36.8 °C) 54 -- 113/70 84 97 % -- -- --    10/18/24 0705 -- -- -- -- -- -- -- -- No Pain    10/18/24 0617 -- 80 -- 88/64 -- -- -- -- --    10/18/24 0353 -- 71 -- 82/42 -- -- -- -- --    10/18/24 03:49:08 -- 57 -- 92/57 69 96 % -- -- --    10/17/24 2251 98 °F (36.7 °C) 62 -- 117/67 84 -- -- -- --    10/17/24 2250 98 °F (36.7 °C) -- -- 117/67 84 -- -- -- --    10/17/24 2151 -- 79 -- 112/68 83 -- -- -- 2    10/17/24 2136 -- -- -- -- -- 96 % None (Room air) -- --     10/17/24 19:02:09 97.7 °F (36.5 °C) 67 -- 112/69 83 100 % -- -- --    10/17/24 16:28:38 97.7 °F (36.5 °C) 71 18 101/58 72 99 % None (Room air) 15 No Pain    10/17/24 1515 -- 78 12 93/57 69 98 % -- -- --    10/17/24 1500 -- 78 16 96/58 73 100 % -- -- --    10/17/24 1450 -- 81 18 83/55 65 -- -- -- --    10/17/24 1445 -- 136 14 92/64 73 98 % -- -- --    10/17/24 1430 -- 144 12 94/53 68 95 % -- -- --    10/17/24 1415 -- 142 19 98/59 73 96 % -- -- --    10/17/24 1400 -- 139 22 99/57 72 99 % -- -- --    10/17/24 1345 -- 144 21 109/67 82 98 % -- -- --    10/17/24 1340 -- -- -- -- -- -- None (Room air) 15 No Pain    10/17/24 1330 -- 148 15 105/67 72 99 % -- -- --    10/17/24 1327 97.1 °F (36.2 °C) 126 18 127/84 99 99 % None (Room air) -- No Pain           Pertinent Labs/Diagnostic Test Results:     Radiology:  CT head wo contrast   Final (10/17 1600)      No acute intracranial hemorrhage, mass effect or midline shift.      XR chest 1 view portable   Final (10/17 1638)      No acute cardiopulmonary disease.        Cardiology:  Echo complete w/ contrast if indicated   Final  (10/18 1107)        Left Ventricle: Left ventricular cavity size is normal. Wall thickness    is normal. The left ventricular ejection fraction is 60%. Systolic    function is normal. Wall motion is normal. Diastolic function is normal.     Left Atrium: The atrium is mildly dilated.        GI:  No orders to display       Results from last 7 days   Lab Units 10/17/24  1643   SARS-COV-2  Negative     Results from last 7 days   Lab Units 10/18/24  0626 10/17/24  1401   WBC Thousand/uL 6.30 6.67   HEMOGLOBIN g/dL 13.6 14.5   HEMATOCRIT % 40.6 42.1   PLATELETS Thousands/uL 147* 144*   TOTAL NEUT ABS Thousands/µL 3.50 5.80         Results from last 7 days   Lab Units 10/18/24  0626 10/17/24  1401   SODIUM mmol/L 139 139   POTASSIUM mmol/L 3.7 3.7   CHLORIDE mmol/L 103 103   CO2 mmol/L 30 25   ANION GAP mmol/L 6 11   BUN mg/dL 12 11   CREATININE mg/dL 0.91 0.87    EGFR ml/min/1.73sq m 102 105   CALCIUM mg/dL 8.6 8.8   MAGNESIUM mg/dL  --  2.2     Results from last 7 days   Lab Units 10/18/24  0626 10/17/24  1401   AST U/L 15 16   ALT U/L 14 17   ALK PHOS U/L 45 49   TOTAL PROTEIN g/dL 5.8* 6.5   ALBUMIN g/dL 4.1 4.5   TOTAL BILIRUBIN mg/dL 0.67 0.60         Results from last 7 days   Lab Units 10/18/24  0626 10/17/24  1401   GLUCOSE RANDOM mg/dL 90 91         Results from last 7 days   Lab Units 10/17/24  1401   HEMOGLOBIN A1C % 4.9   EAG mg/dl 94     Results from last 7 days   Lab Units 10/17/24  1844 10/17/24  1638 10/17/24  1401   HS TNI 0HR ng/L  --   --  6   HS TNI 2HR ng/L  --  15  --    HSTNI D2 ng/L  --  9  --    HS TNI 4HR ng/L 21  --   --    HSTNI D4 ng/L 15  --   --      Results from last 7 days   Lab Units 10/17/24  1401   D-DIMER QUANTITATIVE ug/ml FEU <0.27     Results from last 7 days   Lab Units 10/17/24  1401   PROTIME seconds 13.8   INR  1.01   PTT seconds 24     Results from last 7 days   Lab Units 10/17/24  1401   TSH 3RD GENERATON uIU/mL 1.210       Results from last 7 days   Lab Units 10/17/24  1401   BNP pg/mL 49     Results from last 7 days   Lab Units 10/17/24  1541   CLARITY UA  Clear   COLOR UA  Light Yellow   SPEC GRAV UA  1.010   PH UA  5.5   GLUCOSE UA mg/dl Negative   KETONES UA mg/dl Trace*   BLOOD UA  Negative   PROTEIN UA mg/dl Negative   NITRITE UA  Negative   BILIRUBIN UA  Negative   UROBILINOGEN UA (BE) mg/dl <2.0   LEUKOCYTES UA  Negative             Results from last 7 days   Lab Units 10/17/24  1540   AMPH/METH  Negative   BARBITURATE UR  Negative   BENZODIAZEPINE UR  Negative   COCAINE UR  Negative   METHADONE URINE  Negative   OPIATE UR  Negative   PCP UR  Negative   THC UR  Negative       Past Medical History:   Diagnosis Date    Seizures (HCC)      Present on Admission:   Nonintractable epilepsy without status epilepticus (HCC)   Depression      Admitting Diagnosis:     Weakness [R53.1]  New onset atrial fibrillation (HCC)  [I48.91]  Atrial fibrillation with RVR (HCC) [I48.91]  Nonintractable epilepsy without status epilepticus (HCC) [G40.909]    Age/Sex: 44 y.o. male    Network Utilization Review Department  ATTENTION: Please call with any questions or concerns to [phone] and carefully listen to the prompts so that you are directed to the right person. All voicemails are confidential.   For Discharge needs, contact Care Management DC Support Team at [phone] opt. [phone]  Send all requests for admission clinical reviews, approved or denied determinations and any other requests to dedicated fax number below belonging to the campus where the patient is receiving treatment. List of dedicated fax numbers for the Facilities:  FACILITY NAME UR FAX NUMBER   ADMISSION DENIALS (Administrative/Medical Necessity) [phone]   DISCHARGE SUPPORT TEAM (NETWORK) [phone]   PARENT CHILD HEALTH (Maternity/NICU/Pediatrics) [phone]   General acute hospital [phone]   [facility] [phone]   [facility] [phone]   [facility] [phone]   [facility] [phone]   [facility] [phone]   [facility] [phone]   [facility] [phone]   [facility] [phone]   [facility] [phone]   [facility] [phone]   [facility] [phone]

## 2024-10-18 NOTE — PROGRESS NOTES
Progress Note - Hospitalist   Name: Bobby Escobar 44 y.o. male I MRN: [id number]  Unit/Bed#: 415-01 I Date of Admission: 10/17/2024   Date of Service: 10/18/2024 I Hospital Day: 0    Assessment & Plan  New onset a-fib (HCC)  Patient presents to ED via ambulance after loss of consciousness.  Found to be in A-fib by EMS.  Reports fatigue but no palpitations or chest pain.  Fluid bolus, magnesium, diltiazem bolus 15 mg given in ED. no known heart history or recreational drug use.  [id number] score of 0.    Patient converted to sinus lakshmi last night  DC eliquis and metoprolol, start asa 81mg  Follow up 2D echo    Nonintractable epilepsy without status epilepticus (HCC)  Patient with known history of seizures. First occurring in 2005, with the most recent grand mal seizure being in 2012.  Reports episodes of urinary incontinence overnight that happen at baseline.  On Depakote 750 mg OD.     -Valproic acid level of 42, slightly low  -Continue home Depakote 750 mg daily  -Seizure precautions  Nuero input appreciated, increased depakote to 1000mg BID, outpatient f/u with primary neurologist   Thrombocytopenia (HCC)  Incidental finding , continue to monitor   Depression  Stable and chronic condition   Continue home zoloft   Monitor for symptoms during admission     VTE Pharmacologic Prophylaxis: VTE Score: 2 Low Risk (Score 0-2) - Encourage Ambulation.    Mobility:   Basic Mobility Inpatient Raw Score: 24  JH-HLM Goal: 8: Walk 250 feet or more  JH-HLM Achieved: 8: Walk 250 feet ot more  JH-HLM Goal achieved. Continue to encourage appropriate mobility.    Patient Centered Rounds: I performed bedside rounds with nursing staff today.   Discussions with Specialists or Other Care Team Provider: Cards    Education and Discussions with Family / Patient:     Current Length of Stay: 0 day(s)  Current Patient Status: Observation   Certification Statement: The patient will continue to require additional inpatient hospital stay due  to Seizure  Discharge Plan: Anticipate discharge tomorrow to home.    Code Status: Level 1 - Full Code    Subjective   Seen and examined, feels well no acute complaints or events overnight     Objective :  Temp:  [97.1 °F (36.2 °C)-98 °F (36.7 °C)] 98 °F (36.7 °C)  HR:  [] 80  BP: ()/(42-84) 88/64  Resp:  [12-22] 18  SpO2:  [95 %-100 %] 96 %  O2 Device: None (Room air)    Body mass index is 27.97 kg/m².     Input and Output Summary (last 24 hours):     Intake/Output Summary (Last 24 hours) at 10/18/2024 0752  Last data filed at 10/17/2024 1910  Gross per 24 hour   Intake 1300 ml   Output --   Net 1300 ml       Physical Exam  Constitutional:       General: He is not in acute distress.     Appearance: Normal appearance. He is not ill-appearing.   HENT:      Head: Normocephalic and atraumatic.   Cardiovascular:      Rate and Rhythm: Normal rate and regular rhythm.      Pulses: Normal pulses.      Heart sounds: No murmur heard.     No gallop.   Pulmonary:      Effort: Pulmonary effort is normal. No respiratory distress.      Breath sounds: No wheezing or rales.   Abdominal:      General: Abdomen is flat. Bowel sounds are normal. There is no distension.      Tenderness: There is no abdominal tenderness. There is no guarding.   Skin:     Capillary Refill: Capillary refill takes 2 to 3 seconds.   Neurological:      General: No focal deficit present.      Mental Status: He is alert and oriented to person, place, and time.      Cranial Nerves: No cranial nerve deficit.      Motor: No weakness.           Lines/Drains:        Telemetry:  Telemetry Orders (From admission, onward)               24 Hour Telemetry Monitoring  Continuous x 24 Hours (Telem)        Question:  Reason for 24 Hour Telemetry  Answer:  Arrhythmias requiring acute medical intervention / PPM or ICD malfunction                     Telemetry Reviewed: Normal Sinus Rhythm  Indication for Continued Telemetry Use: Awaiting PCI/EP Study/CABG                Lab Results: I have reviewed the following results:   Results from last 7 days   Lab Units 10/18/24  0626   WBC Thousand/uL 6.30   HEMOGLOBIN g/dL 13.6   HEMATOCRIT % 40.6   PLATELETS Thousands/uL 147*   SEGS PCT % 56   LYMPHO PCT % 35   MONO PCT % 8   EOS PCT % 1     Results from last 7 days   Lab Units 10/18/24  0626   SODIUM mmol/L 139   POTASSIUM mmol/L 3.7   CHLORIDE mmol/L 103   CO2 mmol/L 30   BUN mg/dL 12   CREATININE mg/dL 0.91   ANION GAP mmol/L 6   CALCIUM mg/dL 8.6   ALBUMIN g/dL 4.1   TOTAL BILIRUBIN mg/dL 0.67   ALK PHOS U/L 45   ALT U/L 14   AST U/L 15   GLUCOSE RANDOM mg/dL 90     Results from last 7 days   Lab Units 10/17/24  1401   INR  1.01         Results from last 7 days   Lab Units 10/17/24  1401   HEMOGLOBIN A1C % 4.9           Recent Cultures (last 7 days):         Imaging Results Review: No pertinent imaging studies reviewed.  Other Study Results Review: No additional pertinent studies reviewed.    Last 24 Hours Medication List:     Current Facility-Administered Medications:     acetaminophen (TYLENOL) tablet 650 mg, Q6H PRN    aspirin (ECOTRIN LOW STRENGTH) EC tablet 81 mg, Daily    divalproex sodium (DEPAKOTE ER) 24 hr tablet 1,000 mg, Daily    metoprolol tartrate (LOPRESSOR) partial tablet 12.5 mg, Q12H ROOSEVELT    ondansetron (ZOFRAN) injection 4 mg, Q6H PRN    sertraline (ZOLOFT) tablet 50 mg, Daily    Administrative Statements   Today, Patient Was Seen By: Zaid Henson MD      **Please Note: This note may have been constructed using a voice recognition system.**

## 2024-10-18 NOTE — NURSING NOTE
Went into pt room for q4h VS. Electronic blood pressure cuff was not working, so manual taken. Manual bp 82/42. Pt woken up from sleep to take pressure. Upon asking pt if he had any symptoms, pt responded he was asymptomatic.     Made provider Larry Cuevas aware of situation, as well as knowledge that pt was just started on metoprolol 12.5mg bid with first dose last night at 2144. Also made provider aware that while reviewing tele just recently found an episode of two minutes where pt dropped into 30s hour after metoprolol given that was missed at the time (strip of episode printed and in bin), but pt reading lakshmi in 50s-60s ever since with no symptoms. HR was in 60s when metoprolol given at 2144, bp within parameters.     Bolus ordered and running now. Will continue to monitor pt closely until transfer of care to [facility].

## 2024-10-18 NOTE — NURSING NOTE
During morning medication pass, patient stated he was told not to take ASA and Depakote together. I made a call to pharmacy and spoke with Felix to rule out any possible contraindications of the medications. Les stated that the ASA may increase the effects of the Depakote and that there were some studies done, mainly on children. Stated there with no actual contraindication, just closer monitoring. I relayed this information to patient and he did give verbal consent to administer the medication this morning.

## 2024-10-18 NOTE — ASSESSMENT & PLAN NOTE
Patient with known history of seizures. First occurring in 2005, with the most recent grand mal seizure being in 2012.  Reports episodes of urinary incontinence overnight that happen at baseline.  On Depakote 750 mg OD.     -Valproic acid level of 42, slightly low  -Continue home Depakote 750 mg daily  -Seizure precautions  Nuero input appreciated, increased depakote to 1000mg BID, outpatient f/u with primary neurologist

## 2024-10-18 NOTE — CASE MANAGEMENT
Case Management Discharge Planning Note    Patient name Bobby Escobar  Location /415-01 MRN [id number]  : 1980 Date 10/18/2024       Current Admission Date: 10/17/2024  Current Admission Diagnosis:New onset a-fib (HCC)   Patient Active Problem List    Diagnosis Date Noted Date Diagnosed    New onset a-fib (HCC) 10/17/2024     Thrombocytopenia (HCC) 10/17/2024     Depression 2024     Nonintractable epilepsy without status epilepticus (HCC)      Peyronie disease 2023     Anxiety 2023     Migraine without aura and without status migrainosus, not intractable 2022     Seizures (HCC)        LOS (days): 0  Geometric Mean LOS (GMLOS) (days):   Days to GMLOS:     OBJECTIVE:            Current admission status: Observation   Preferred Pharmacy:   [id number] [address]  Phone: [phone] Fax: [phone]    Primary Care Provider: Ronnell Oro DO    Primary Insurance: [employer]  Secondary Insurance:     DISCHARGE DETAILS:        Chart review completed.  No CM needs at this time.  CM to follow for any discharge needs.

## 2024-10-19 VITALS
BODY MASS INDEX: 28.03 KG/M2 | OXYGEN SATURATION: 98 % | TEMPERATURE: 97.4 F | RESPIRATION RATE: 16 BRPM | DIASTOLIC BLOOD PRESSURE: 64 MMHG | WEIGHT: 178.57 LBS | HEART RATE: 71 BPM | SYSTOLIC BLOOD PRESSURE: 104 MMHG | HEIGHT: 67 IN

## 2024-10-19 PROCEDURE — 99239 HOSP IP/OBS DSCHRG MGMT >30: CPT | Performed by: FAMILY MEDICINE

## 2024-10-19 RX ORDER — DIVALPROEX SODIUM 500 MG/1
1000 TABLET, FILM COATED, EXTENDED RELEASE ORAL DAILY
Qty: 60 TABLET | Refills: 0 | Status: SHIPPED | OUTPATIENT
Start: 2024-10-19

## 2024-10-19 RX ORDER — ASPIRIN 81 MG/1
81 TABLET, CHEWABLE ORAL DAILY
Start: 2024-10-19

## 2024-10-19 RX ADMIN — ASPIRIN 81 MG: 81 TABLET, COATED ORAL at 08:49

## 2024-10-19 RX ADMIN — DIVALPROEX SODIUM 1000 MG: 250 TABLET, FILM COATED, EXTENDED RELEASE ORAL at 08:49

## 2024-10-19 RX ADMIN — SERTRALINE HYDROCHLORIDE 50 MG: 50 TABLET ORAL at 08:49

## 2024-10-19 NOTE — NURSING NOTE
AVS reviewed with patient. Patient in stable condition. Ambulated with [place] ambulance transporter off unit.

## 2024-10-19 NOTE — ASSESSMENT & PLAN NOTE
Patient with known history of seizures. First occurring in 2005, with the most recent grand mal seizure being in 2012.  Reports episodes of urinary incontinence overnight that happen at baseline.  On Depakote 750 mg OD.     -Valproic acid level of 42, slightly low  -Continue home Depakote 750 mg daily  -Seizure precautions  Nuero input appreciated, increased depakote to 1000mg daily, outpatient f/u with primary neurologist

## 2024-10-19 NOTE — DISCHARGE SUMMARY
Discharge Summary - Hospitalist   Name: Bobby Escobar 44 y.o. male I MRN: [id number]  Unit/Bed#: 415-01 I Date of Admission: 10/17/2024   Date of Service: 10/19/2024 I Hospital Day: 0     Assessment & Plan  New onset a-fib (HCC)  Patient presents to ED via ambulance after loss of consciousness.  Found to be in A-fib by EMS.  Reports fatigue but no palpitations or chest pain.  Fluid bolus, magnesium, diltiazem bolus 15 mg given in ED. no known heart history or recreational drug use.  SMS1BS2-ANZo score of 0.    Patient converted to sinus lakshmi last night  DC eliquis and metoprolol, start asa 81mg    2D echo unremarkable, converted to NSR and I DC beta blockers. Referral to cardiology sent and patient instructed to follow up as outpatient     Nonintractable epilepsy without status epilepticus (HCC)  Patient with known history of seizures. First occurring in 2005, with the most recent grand mal seizure being in 2012.  Reports episodes of urinary incontinence overnight that happen at baseline.  On Depakote 750 mg OD.     -Valproic acid level of 42, slightly low  -Continue home Depakote 750 mg daily  -Seizure precautions  Nuero input appreciated, increased depakote to 1000mg daily, outpatient f/u with primary neurologist   Thrombocytopenia (HCC)  Incidental finding , continue to monitor   Referral to Hem/Onc sent prior to DC  Depression  Stable and chronic condition   Continue home zoloft   Monitor for symptoms during admission      Medical Problems       Resolved Problems  Date Reviewed: 10/17/2024   None       Discharging Physician / Practitioner: Zaid Henson MD  PCP: Ronnell Oro DO  Admission Date:   Admission Orders (From admission, onward)       Ordered        10/17/24 1512  Place in Observation  Once                          Discharge Date: 10/19/24    Consultations During Hospital Stay:  Neurology    Procedures Performed:   Echo    Significant Findings / Test Results:       Incidental Findings:          Test  Results Pending at Discharge (will require follow up):        Outpatient Tests Requested:  Ambulatory eeg    Complications:  none    Reason for Admission: syncope    Hospital Course:   Bobby Escobar is a 44 y.o. male patient who originally presented to the hospital on 10/17/2024 due to syncopal episode.  Patient stated that he had an episode of urinary incontinence, and felt fatigued afterwards, subsequently went to stand up and syncopized.  He was brought to the emergency room for evaluation and was found to have atrial fibrillation with rapid ventricular response, treated with 1 dose of IV Cardizem and later converted to sinus rhythm.  2D echocardiogram was obtained and was unremarkable.  Patient will be discharged on aspirin monotherapy and referral to cardiology was placed prior to discharge.    There is concern for possible seizure disorder, patient is established with neurology, he was seen with neurology in consult who titrated Depakote to 1000 mg daily.  He remained seizure-free for 24 hours and will be discharged home to follow-up with his primary neurologist.           Please see above list of diagnoses and related plan for additional information.     Condition at Discharge: good    Discharge Day Visit / Exam:        Discussion with Family: Updated  (mother) at bedside.    Discharge instructions/Information to patient and family:   See after visit summary for information provided to patient and family.      Provisions for Follow-Up Care:  See after visit summary for information related to follow-up care and any pertinent home health orders.      Mobility at time of Discharge:   Basic Mobility Inpatient Raw Score: 24  JH-HLM Goal: 8: Walk 250 feet or more  JH-HLM Achieved: 7: Walk 25 feet or more  HLM Goal achieved. Continue to encourage appropriate mobility.     Disposition:   Home    Planned Readmission: No    Discharge Medications:  See after visit summary for reconciled discharge  medications provided to patient and/or family.      Administrative Statements   Discharge Statement:  I have spent a total time of 35 minutes in caring for this patient on the day of the visit/encounter. >30 minutes of time was spent on: Diagnostic results, Risks and benefits of tx options, Importance of tx compliance, and Documenting in the medical record.    **Please Note: This note may have been constructed using a voice recognition system**

## 2024-10-19 NOTE — CASE MANAGEMENT
Case Management Discharge Planning Note    Patient name Bobby Escobar  Location /415-01 MRN [id number]  : 1980 Date 10/19/2024       Current Admission Date: 10/17/2024  Current Admission Diagnosis:New onset a-fib (HCC)   Patient Active Problem List    Diagnosis Date Noted Date Diagnosed    New onset a-fib (HCC) 10/17/2024     Thrombocytopenia (HCC) 10/17/2024     Depression 2024     Nonintractable epilepsy without status epilepticus (HCC)      Peyronie disease 2023     Anxiety 2023     Migraine without aura and without status migrainosus, not intractable 2022     Seizures (HCC)        LOS (days): 0  Geometric Mean LOS (GMLOS) (days):   Days to GMLOS:     OBJECTIVE:            Current admission status: Observation   Preferred Pharmacy:   [id number] [address]  Phone: [phone] Fax: [phone]    Primary Care Provider: Ronnell Oro DO    Primary Insurance: [employer]  Secondary Insurance:     DISCHARGE DETAILS:  Pt is being dc'd home on this date.

## 2024-10-19 NOTE — CASE MANAGEMENT
Case Management Discharge Planning Note    Patient name Bobby Escobar  Location /415-01 MRN [id number]  : 1980 Date 10/19/2024       Current Admission Date: 10/17/2024  Current Admission Diagnosis:New onset a-fib (HCC)   Patient Active Problem List    Diagnosis Date Noted Date Diagnosed    New onset a-fib (HCC) 10/17/2024     Thrombocytopenia (HCC) 10/17/2024     Depression 2024     Nonintractable epilepsy without status epilepticus (HCC)      Peyronie disease 2023     Anxiety 2023     Migraine without aura and without status migrainosus, not intractable 2022     Seizures (HCC)        LOS (days): 0  Geometric Mean LOS (GMLOS) (days):   Days to GMLOS:     OBJECTIVE:            Current admission status: Observation   Preferred Pharmacy:   [id number] - [address] [address]  Phone: [phone] Fax: [phone]    Primary Care Provider: Ronnell Oro DO    Primary Insurance: [employer]  Secondary Insurance:     DISCHARGE DETAILS:  [facility]  Discussed with Patient  there may be an out of pocket expense for transport.

## 2024-10-19 NOTE — ASSESSMENT & PLAN NOTE
Patient presents to ED via ambulance after loss of consciousness.  Found to be in A-fib by EMS.  Reports fatigue but no palpitations or chest pain.  Fluid bolus, magnesium, diltiazem bolus 15 mg given in ED. no known heart history or recreational drug use.  IRG2NH2-CXBy score of 0.    Patient converted to sinus lakshmi last night  DC eliquis and metoprolol, start asa 81mg    2D echo unremarkable, converted to NSR and I DC beta blockers. Referral to cardiology sent and patient instructed to follow up as outpatient

## 2024-10-23 LAB
ATRIAL RATE: 138 BPM
ATRIAL RATE: 340 BPM
QRS AXIS: 83 DEGREES
QRS AXIS: 85 DEGREES
QRSD INTERVAL: 100 MS
QRSD INTERVAL: 98 MS
QT INTERVAL: 316 MS
QT INTERVAL: 368 MS
QTC INTERVAL: 379 MS
QTC INTERVAL: 486 MS
T WAVE AXIS: 70 DEGREES
T WAVE AXIS: 78 DEGREES
VENTRICULAR RATE: 142 BPM
VENTRICULAR RATE: 64 BPM

## 2024-10-23 PROCEDURE — 93010 ELECTROCARDIOGRAM REPORT: CPT | Performed by: INTERNAL MEDICINE

## 2024-10-29 LAB
ATRIAL RATE: 52 BPM
P AXIS: 49 DEGREES
PR INTERVAL: 130 MS
QRS AXIS: 63 DEGREES
QRSD INTERVAL: 94 MS
QT INTERVAL: 418 MS
QTC INTERVAL: 388 MS
T WAVE AXIS: 63 DEGREES
VENTRICULAR RATE: 52 BPM

## 2024-10-29 PROCEDURE — 93010 ELECTROCARDIOGRAM REPORT: CPT | Performed by: INTERNAL MEDICINE

## 2024-11-26 ENCOUNTER — OFFICE VISIT (OUTPATIENT)
Dept: HEMATOLOGY ONCOLOGY | Facility: CLINIC | Age: 44
End: 2024-11-26
Payer: COMMERCIAL

## 2024-11-26 VITALS
BODY MASS INDEX: 29.35 KG/M2 | HEIGHT: 67 IN | DIASTOLIC BLOOD PRESSURE: 68 MMHG | WEIGHT: 187 LBS | OXYGEN SATURATION: 99 % | HEART RATE: 75 BPM | SYSTOLIC BLOOD PRESSURE: 110 MMHG | RESPIRATION RATE: 18 BRPM | TEMPERATURE: 98.1 F

## 2024-11-26 DIAGNOSIS — D69.6 THROMBOCYTOPENIA (HCC): ICD-10-CM

## 2024-11-26 PROCEDURE — 99244 OFF/OP CNSLTJ NEW/EST MOD 40: CPT | Performed by: INTERNAL MEDICINE

## 2024-11-26 RX ORDER — METOPROLOL SUCCINATE 25 MG/1
25 TABLET, EXTENDED RELEASE ORAL DAILY
COMMUNITY
Start: 2024-11-26 | End: 2025-11-26

## 2024-11-26 NOTE — PROGRESS NOTES
Name: Bobby Escobar      : 1980      MRN: [id number]  Encounter Provider: Khushbu Dawson MD  Encounter Date: 2024   Encounter department: [facility] [place]  :  Assessment & Plan  Thrombocytopenia (HCC)  The patient was reassured that his platelet count is not dramatically low.  It seems to be hovering around the low normal range.  This is most likely related to the increased dose of Depakote.  However, other etiologies cannot be entirely ruled out.  We did discuss pursuing an ultrasound of the abdomen to evaluate the size of the liver and spleen.  We will pursue a limited workup prior to his next visit and act accordingly.    Orders:    Ambulatory Referral to Hematology / Oncology    US abdomen complete; Future    CBC and differential; Future    Comprehensive metabolic panel; Future    Magnesium; Future    LD,Blood; Future    C-reactive protein; Future    Sedimentation rate, automated; Future    Vitamin B12; Future    Folate; Future    Iron Panel (Includes Ferritin, Iron Sat%, Iron, and TIBC); Future    Ferritin; Future        History of Present Illness     HPI  Bobby Escobar is a 44 y.o. male who presents for further evaluation of his borderline low platelet count.  He seems to have a history of seizure disorder and is on Depakote since .  The dose of the Depakote was recently increased due to seizure activities.  He was found to have platelet count around the low normal range around 147 with normal hemoglobin and white cell count.    History obtained from: patient    Review of Systems   Constitutional:  Negative for chills and fever.   HENT:  Negative for ear pain and sore throat.    Eyes:  Negative for pain and visual disturbance.   Respiratory:  Negative for cough and shortness of breath.    Cardiovascular:  Negative for chest pain and palpitations.   Gastrointestinal:  Negative for abdominal pain and vomiting.   Genitourinary:  Negative for dysuria and  "hematuria.   Musculoskeletal:  Negative for arthralgias and back pain.   Skin:  Negative for color change and rash.   Neurological:  Negative for seizures and syncope.   All other systems reviewed and are negative.    Pertinent Medical History   None.     Objective   /68 (BP Location: Left arm, Patient Position: Sitting, Cuff Size: Adult)   Pulse 75   Temp 98.1 °F (36.7 °C)   Resp 18   Ht 5' 7\" (1.702 m)   Wt 84.8 kg (187 lb)   SpO2 99%   BMI 29.29 kg/m²      Physical Exam  Vitals and nursing note reviewed.   Constitutional:       General: He is not in acute distress.     Appearance: He is well-developed.   HENT:      Head: Normocephalic and atraumatic.   Eyes:      Conjunctiva/sclera: Conjunctivae normal.   Cardiovascular:      Rate and Rhythm: Normal rate and regular rhythm.      Heart sounds: No murmur heard.  Pulmonary:      Effort: Pulmonary effort is normal. No respiratory distress.      Breath sounds: Normal breath sounds.   Abdominal:      Palpations: Abdomen is soft.      Tenderness: There is abdominal tenderness.   Musculoskeletal:         General: No swelling.      Cervical back: Neck supple.   Skin:     General: Skin is warm and dry.      Capillary Refill: Capillary refill takes less than 2 seconds.   Neurological:      Mental Status: He is alert.   Psychiatric:         Mood and Affect: Mood normal.           " WDL

## 2024-11-26 NOTE — ASSESSMENT & PLAN NOTE
The patient was reassured that his platelet count is not dramatically low.  It seems to be hovering around the low normal range.  This is most likely related to the increased dose of Depakote.  However, other etiologies cannot be entirely ruled out.  We did discuss pursuing an ultrasound of the abdomen to evaluate the size of the liver and spleen.  We will pursue a limited workup prior to his next visit and act accordingly.    Orders:    Ambulatory Referral to Hematology / Oncology    US abdomen complete; Future    CBC and differential; Future    Comprehensive metabolic panel; Future    Magnesium; Future    LD,Blood; Future    C-reactive protein; Future    Sedimentation rate, automated; Future    Vitamin B12; Future    Folate; Future    Iron Panel (Includes Ferritin, Iron Sat%, Iron, and TIBC); Future    Ferritin; Future

## 2025-03-04 ENCOUNTER — HOSPITAL ENCOUNTER (OUTPATIENT)
Dept: ULTRASOUND IMAGING | Facility: HOSPITAL | Age: 45
Discharge: HOME | End: 2025-03-04
Attending: INTERNAL MEDICINE
Payer: COMMERCIAL

## 2025-03-04 DIAGNOSIS — D69.6 THROMBOCYTOPENIA (HCC): ICD-10-CM

## 2025-03-04 PROCEDURE — 76700 US EXAM ABDOM COMPLETE: CPT

## 2025-04-01 ENCOUNTER — TELEPHONE (OUTPATIENT)
Dept: HEMATOLOGY ONCOLOGY | Facility: CLINIC | Age: 45
End: 2025-04-01

## 2025-04-01 NOTE — TELEPHONE ENCOUNTER
Rescheduled dr ybarra appointment due to not getting labs done and he was 20 minutes late, rescheduled for 04/15/2025 @ 10:20.

## 2025-04-11 ENCOUNTER — APPOINTMENT (OUTPATIENT)
Dept: LAB | Facility: HOSPITAL | Age: 45
End: 2025-04-11
Payer: COMMERCIAL

## 2025-04-11 DIAGNOSIS — D69.6 THROMBOCYTOPENIA (HCC): ICD-10-CM

## 2025-04-11 LAB
ALBUMIN SERPL BCG-MCNC: 4.9 G/DL (ref 3.5–5)
ALP SERPL-CCNC: 56 U/L (ref 34–104)
ALT SERPL W P-5'-P-CCNC: 14 U/L (ref 7–52)
ANION GAP SERPL CALCULATED.3IONS-SCNC: 6 MMOL/L (ref 4–13)
AST SERPL W P-5'-P-CCNC: 13 U/L (ref 13–39)
BASOPHILS # BLD AUTO: 0.02 THOUSANDS/ÂΜL (ref 0–0.1)
BASOPHILS NFR BLD AUTO: 1 % (ref 0–1)
BILIRUB SERPL-MCNC: 0.66 MG/DL (ref 0.2–1)
BUN SERPL-MCNC: 14 MG/DL (ref 5–25)
CALCIUM SERPL-MCNC: 9.2 MG/DL (ref 8.4–10.2)
CHLORIDE SERPL-SCNC: 102 MMOL/L (ref 96–108)
CO2 SERPL-SCNC: 31 MMOL/L (ref 21–32)
CREAT SERPL-MCNC: 0.9 MG/DL (ref 0.6–1.3)
CRP SERPL QL: <1 MG/L
EOSINOPHIL # BLD AUTO: 0.03 THOUSAND/ÂΜL (ref 0–0.61)
EOSINOPHIL NFR BLD AUTO: 1 % (ref 0–6)
ERYTHROCYTE [DISTWIDTH] IN BLOOD BY AUTOMATED COUNT: 11.9 % (ref 11.6–15.1)
ERYTHROCYTE [SEDIMENTATION RATE] IN BLOOD: <1 MM/HOUR (ref 0–14)
FERRITIN SERPL-MCNC: 102 NG/ML (ref 30–336)
FOLATE SERPL-MCNC: 11.6 NG/ML
GFR SERPL CREATININE-BSD FRML MDRD: 104 ML/MIN/1.73SQ M
GLUCOSE P FAST SERPL-MCNC: 93 MG/DL (ref 65–99)
HCT VFR BLD AUTO: 46.1 % (ref 36.5–49.3)
HGB BLD-MCNC: 16 G/DL (ref 12–17)
IMM GRANULOCYTES # BLD AUTO: 0.01 THOUSAND/UL (ref 0–0.2)
IMM GRANULOCYTES NFR BLD AUTO: 0 % (ref 0–2)
IRON SATN MFR SERPL: 28 % (ref 15–50)
IRON SERPL-MCNC: 101 UG/DL (ref 50–212)
LDH SERPL-CCNC: 110 U/L (ref 140–271)
LYMPHOCYTES # BLD AUTO: 1.42 THOUSANDS/ÂΜL (ref 0.6–4.47)
LYMPHOCYTES NFR BLD AUTO: 33 % (ref 14–44)
MAGNESIUM SERPL-MCNC: 1.9 MG/DL (ref 1.9–2.7)
MCH RBC QN AUTO: 29.5 PG (ref 26.8–34.3)
MCHC RBC AUTO-ENTMCNC: 34.7 G/DL (ref 31.4–37.4)
MCV RBC AUTO: 85 FL (ref 82–98)
MONOCYTES # BLD AUTO: 0.37 THOUSAND/ÂΜL (ref 0.17–1.22)
MONOCYTES NFR BLD AUTO: 9 % (ref 4–12)
NEUTROPHILS # BLD AUTO: 2.49 THOUSANDS/ÂΜL (ref 1.85–7.62)
NEUTS SEG NFR BLD AUTO: 56 % (ref 43–75)
NRBC BLD AUTO-RTO: 0 /100 WBCS
PLATELET # BLD AUTO: 150 THOUSANDS/UL (ref 149–390)
PMV BLD AUTO: 8.4 FL (ref 8.9–12.7)
POTASSIUM SERPL-SCNC: 4.3 MMOL/L (ref 3.5–5.3)
PROT SERPL-MCNC: 7.1 G/DL (ref 6.4–8.4)
RBC # BLD AUTO: 5.42 MILLION/UL (ref 3.88–5.62)
SODIUM SERPL-SCNC: 139 MMOL/L (ref 135–147)
TIBC SERPL-MCNC: 364 UG/DL (ref 250–450)
TRANSFERRIN SERPL-MCNC: 260 MG/DL (ref 203–362)
UIBC SERPL-MCNC: 263 UG/DL (ref 155–355)
VIT B12 SERPL-MCNC: 509 PG/ML (ref 180–914)
WBC # BLD AUTO: 4.34 THOUSAND/UL (ref 4.31–10.16)

## 2025-04-11 PROCEDURE — 83615 LACTATE (LD) (LDH) ENZYME: CPT

## 2025-04-11 PROCEDURE — 86140 C-REACTIVE PROTEIN: CPT

## 2025-04-11 PROCEDURE — 36415 COLL VENOUS BLD VENIPUNCTURE: CPT

## 2025-04-11 PROCEDURE — 83550 IRON BINDING TEST: CPT

## 2025-04-11 PROCEDURE — 80053 COMPREHEN METABOLIC PANEL: CPT

## 2025-04-11 PROCEDURE — 82607 VITAMIN B-12: CPT

## 2025-04-11 PROCEDURE — 83735 ASSAY OF MAGNESIUM: CPT

## 2025-04-11 PROCEDURE — 82746 ASSAY OF FOLIC ACID SERUM: CPT

## 2025-04-11 PROCEDURE — 83540 ASSAY OF IRON: CPT

## 2025-04-11 PROCEDURE — 85025 COMPLETE CBC W/AUTO DIFF WBC: CPT

## 2025-04-11 PROCEDURE — 85652 RBC SED RATE AUTOMATED: CPT

## 2025-04-11 PROCEDURE — 82728 ASSAY OF FERRITIN: CPT

## 2025-04-15 ENCOUNTER — OFFICE VISIT (OUTPATIENT)
Dept: HEMATOLOGY ONCOLOGY | Facility: CLINIC | Age: 45
End: 2025-04-15
Payer: COMMERCIAL

## 2025-04-15 VITALS
DIASTOLIC BLOOD PRESSURE: 80 MMHG | HEIGHT: 67 IN | HEART RATE: 66 BPM | BODY MASS INDEX: 31.39 KG/M2 | TEMPERATURE: 98.2 F | RESPIRATION RATE: 18 BRPM | WEIGHT: 200 LBS | SYSTOLIC BLOOD PRESSURE: 126 MMHG | OXYGEN SATURATION: 97 %

## 2025-04-15 DIAGNOSIS — D69.6 THROMBOCYTOPENIA (HCC): Primary | ICD-10-CM

## 2025-04-15 PROCEDURE — 99213 OFFICE O/P EST LOW 20 MIN: CPT | Performed by: INTERNAL MEDICINE

## 2025-04-15 RX ORDER — ERGOCALCIFEROL 1.25 MG/1
CAPSULE ORAL
COMMUNITY

## 2025-04-15 RX ORDER — OMEGA-3 FATTY ACIDS/FISH OIL 300-1000MG
CAPSULE ORAL
COMMUNITY

## 2025-04-15 NOTE — ASSESSMENT & PLAN NOTE
The patient's most recent platelet count was within the low normal range.  His low platelet count 1 is most likely related to the Depakote.  I did discuss with him the result of the ultrasound of the abdomen which was done on 3/4/2025 which showed top normal spleen otherwise normal study.  The patient was asked to follow-up with his PCP and I would be more than happy to see him on as needed basis.

## 2025-04-15 NOTE — PROGRESS NOTES
Name: Bobby Escobar      : 1980      MRN: [id number]  Encounter Provider: Khushbu Dawson MD  Encounter Date: 4/15/2025   Encounter department: [facility] [place]  :  Assessment & Plan  Thrombocytopenia (HCC)  The patient's most recent platelet count was within the low normal range.  His low platelet count 1 is most likely related to the Depakote.  I did discuss with him the result of the ultrasound of the abdomen which was done on 3/4/2025 which showed top normal spleen otherwise normal study.  The patient was asked to follow-up with his PCP and I would be more than happy to see him on as needed basis.           Return if symptoms worsen or fail to improve.    History of Present Illness   Chief Complaint   Patient presents with    Follow-up   HPI:  Bobby Escobar is a 44 y.o. male who presents for further evaluation of his borderline low platelet count.  He seems to have a history of seizure disorder and is on Depakote since .  The dose of the Depakote was recently increased due to seizure activities.  He was found to have platelet count around the low normal range around 147 with normal hemoglobin and white cell count.    Interval history:  Patient here for follow-up visit.  He continues to be on Depakote.  Blood work on 2025 showed hemoglobin of 16.0 with normal white cell count.  Platelet count was also normal at 150.  White cell differential was within normal range.  CMP was entirely normal.  Inflammation markers were within normal range with normal vitamin B12 level.  Iron panel was normal.         Review of Systems   Constitutional:  Positive for fatigue. Negative for chills and fever.   HENT:  Positive for hearing loss. Negative for ear pain and sore throat.    Eyes:  Negative for pain and visual disturbance.   Respiratory:  Positive for cough and shortness of breath.    Cardiovascular:  Negative for chest pain and palpitations.   Gastrointestinal:  Negative  "for abdominal pain and vomiting.   Genitourinary:  Negative for dysuria and hematuria.   Musculoskeletal:  Negative for arthralgias and back pain.   Skin:  Negative for color change and rash.   Neurological:  Positive for numbness and headaches. Negative for seizures and syncope.   Psychiatric/Behavioral:  Positive for sleep disturbance.    All other systems reviewed and are negative.    Medical History Reviewed by provider this encounter:  Tobacco  Allergies  Meds  Problems  Med Hx  Surg Hx  Fam Hx     .  Current Outpatient Medications on File Prior to Visit   Medication Sig Dispense Refill    Cholecalciferol 125 MCG (5000 UT) TABS Take 5,000 Units by mouth daily      Coenzyme Q10 (CoQ-10) 100 MG CAPS Take by mouth      divalproex sodium (DEPAKOTE ER) 500 mg 24 hr tablet Take 2 tablets (1,000 mg total) by mouth daily 60 tablet 0    Ibuprofen (Advil Migraine) 200 MG CAPS Take by mouth      MAGNESIUM PO Take 1 tablet by mouth daily      metoprolol succinate (TOPROL-XL) 25 mg 24 hr tablet Take 25 mg by mouth daily      Riboflavin 25 MG TABS Take 4 tablets by mouth daily      sertraline (ZOLOFT) 50 mg tablet Take 25 mg by mouth daily      SUMAtriptan (IMITREX) 100 mg tablet Take 100 mg by mouth      Vitamin D, Ergocalciferol, 06961 units CAPS Take by mouth      aspirin 81 mg chewable tablet Chew 1 tablet (81 mg total) daily (Patient not taking: Reported on 11/26/2024)       No current facility-administered medications on file prior to visit.      Social History     Tobacco Use    Smoking status: Never    Smokeless tobacco: Never   Vaping Use    Vaping status: Never Used   Substance and Sexual Activity    Alcohol use: Yes     Comment: social    Drug use: Never    Sexual activity: Not on file         Objective   /80 (BP Location: Left arm, Patient Position: Sitting, Cuff Size: Adult)   Pulse 66   Temp 98.2 °F (36.8 °C)   Resp 18   Ht 5' 7\" (1.702 m)   Wt 90.7 kg (200 lb)   SpO2 97%   BMI 31.32 kg/m² "     Physical Exam  Constitutional:       Appearance: He is well-developed.   HENT:      Head: Normocephalic and atraumatic.      Nose: Nose normal.   Eyes:      General: No scleral icterus.        Right eye: No discharge.         Left eye: No discharge.      Conjunctiva/sclera: Conjunctivae normal.      Pupils: Pupils are equal, round, and reactive to light.   Neck:      Thyroid: No thyromegaly.      Trachea: No tracheal deviation.   Cardiovascular:      Rate and Rhythm: Normal rate and regular rhythm.      Heart sounds: Normal heart sounds. No murmur heard.     No friction rub.   Pulmonary:      Effort: Pulmonary effort is normal. No respiratory distress.      Breath sounds: Normal breath sounds. No wheezing or rales.   Chest:      Chest wall: No tenderness.   Abdominal:      General: There is no distension.      Palpations: Abdomen is soft. There is no hepatomegaly or splenomegaly.      Tenderness: There is no abdominal tenderness. There is no guarding or rebound.   Musculoskeletal:         General: No tenderness or deformity. Normal range of motion.      Cervical back: Normal range of motion and neck supple.   Lymphadenopathy:      Cervical: No cervical adenopathy.   Skin:     General: Skin is warm and dry.      Coloration: Skin is not pale.      Findings: No erythema or rash.   Neurological:      Mental Status: He is alert and oriented to person, place, and time.      Cranial Nerves: No cranial nerve deficit.      Coordination: Coordination normal.      Deep Tendon Reflexes: Reflexes are normal and symmetric.   Psychiatric:         Thought Content: Thought content normal.         Judgment: Judgment normal.         Labs: I have reviewed the following labs:  Results for orders placed or performed in visit on 04/11/25   CBC and differential   Result Value Ref Range    WBC 4.34 4.31 - 10.16 Thousand/uL    RBC 5.42 3.88 - 5.62 Million/uL    Hemoglobin 16.0 12.0 - 17.0 g/dL    Hematocrit 46.1 36.5 - 49.3 %    MCV 85  82 - 98 fL    MCH 29.5 26.8 - 34.3 pg    MCHC 34.7 31.4 - 37.4 g/dL    RDW 11.9 11.6 - 15.1 %    MPV 8.4 (L) 8.9 - 12.7 fL    Platelets 150 149 - 390 Thousands/uL    nRBC 0 /100 WBCs    Segmented % 56 43 - 75 %    Immature Grans % 0 0 - 2 %    Lymphocytes % 33 14 - 44 %    Monocytes % 9 4 - 12 %    Eosinophils Relative 1 0 - 6 %    Basophils Relative 1 0 - 1 %    Absolute Neutrophils 2.49 1.85 - 7.62 Thousands/µL    Absolute Immature Grans 0.01 0.00 - 0.20 Thousand/uL    Absolute Lymphocytes 1.42 0.60 - 4.47 Thousands/µL    Absolute Monocytes 0.37 0.17 - 1.22 Thousand/µL    Eosinophils Absolute 0.03 0.00 - 0.61 Thousand/µL    Basophils Absolute 0.02 0.00 - 0.10 Thousands/µL   Comprehensive metabolic panel   Result Value Ref Range    Sodium 139 135 - 147 mmol/L    Potassium 4.3 3.5 - 5.3 mmol/L    Chloride 102 96 - 108 mmol/L    CO2 31 21 - 32 mmol/L    ANION GAP 6 4 - 13 mmol/L    BUN 14 5 - 25 mg/dL    Creatinine 0.90 0.60 - 1.30 mg/dL    Glucose, Fasting 93 65 - 99 mg/dL    Calcium 9.2 8.4 - 10.2 mg/dL    AST 13 13 - 39 U/L    ALT 14 7 - 52 U/L    Alkaline Phosphatase 56 34 - 104 U/L    Total Protein 7.1 6.4 - 8.4 g/dL    Albumin 4.9 3.5 - 5.0 g/dL    Total Bilirubin 0.66 0.20 - 1.00 mg/dL    eGFR 104 ml/min/1.73sq m   Result Value Ref Range    Magnesium 1.9 1.9 - 2.7 mg/dL   LD,Blood   Result Value Ref Range     (L) 140 - 271 U/L   Result Value Ref Range    CRP <1.0 <3.0 mg/L   Sedimentation rate, automated   Result Value Ref Range    Sed Rate <1 0 - 14 mm/hour   Vitamin B12   Result Value Ref Range    Vitamin B-12 509 180 - 914 pg/mL   Result Value Ref Range    Folate 11.6 >5.9 ng/mL   TIBC Panel (incl. Iron, TIBC, % Iron Saturation)   Result Value Ref Range    Iron Saturation 28 15 - 50 %    TIBC 364 250 - 450 ug/dL    Iron 101 50 - 212 ug/dL    Transferrin 260 203 - 362 mg/dL    UIBC 263 155 - 355 ug/dL   Result Value Ref Range    Ferritin 102 30 - 336 ng/mL     Lab Results   Component Value Date/Time     WBC 4.34 04/11/2025 03:02 PM    RBC 5.42 04/11/2025 03:02 PM    Hemoglobin 16.0 04/11/2025 03:02 PM    Hematocrit 46.1 04/11/2025 03:02 PM    MCV 85 04/11/2025 03:02 PM    MCH 29.5 04/11/2025 03:02 PM    RDW 11.9 04/11/2025 03:02 PM    Platelets 150 04/11/2025 03:02 PM    Segmented % 56 04/11/2025 03:02 PM    Lymphocytes % 33 04/11/2025 03:02 PM    Monocytes % 9 04/11/2025 03:02 PM    Eosinophils Relative 1 04/11/2025 03:02 PM    Basophils Relative 1 04/11/2025 03:02 PM    Immature Grans % 0 04/11/2025 03:02 PM    Absolute Neutrophils 2.49 04/11/2025 03:02 PM      Lab Results   Component Value Date/Time    Sodium 139 04/11/2025 03:02 PM    Sodium 140 01/03/2025 12:59 PM    Potassium 4.3 04/11/2025 03:02 PM    Potassium 4.9 01/03/2025 12:59 PM    Chloride 102 04/11/2025 03:02 PM    Chloride 103 01/03/2025 12:59 PM    Carbon Dioxide 30 01/03/2025 12:59 PM    CO2 31 04/11/2025 03:02 PM    ANION GAP 6 04/11/2025 03:02 PM    ANION GAP 7 01/03/2025 12:59 PM    BUN 14 04/11/2025 03:02 PM    BUN 15 01/03/2025 12:59 PM    Creatinine 0.90 04/11/2025 03:02 PM    Creatinine 0.84 01/03/2025 12:59 PM    Glucose 83 01/03/2025 12:59 PM    Glucose, Fasting 93 04/11/2025 03:02 PM    Calcium 9.2 04/11/2025 03:02 PM    Calcium 9.3 01/03/2025 12:59 PM    AST 13 04/11/2025 03:02 PM    AST 14 01/03/2025 12:59 PM    ALT 14 04/11/2025 03:02 PM    ALT 12 01/03/2025 12:59 PM    Alkaline Phosphatase 56 04/11/2025 03:02 PM    Alkaline Phosphatase 45 01/03/2025 12:59 PM    Total Protein 7.1 04/11/2025 03:02 PM    Protein, Total 6.5 01/03/2025 12:59 PM    Albumin 4.9 04/11/2025 03:02 PM    ALBUMIN 4.6 01/03/2025 12:59 PM    Total Bilirubin 0.66 04/11/2025 03:02 PM    Total Bilirubin 0.9 01/03/2025 12:59 PM    eGFRcr 110 01/03/2025 12:59 PM    eGFR 104 04/11/2025 03:02 PM      Lab Results   Component Value Date/Time    Iron 101 04/11/2025 03:02 PM    Iron Saturation 28 04/11/2025 03:02 PM    Ferritin 102 04/11/2025 03:02 PM    Vitamin B-12 509  04/11/2025 03:02 PM    Folate 11.6 04/11/2025 03:02 PM    Sed Rate <1 04/11/2025 03:02 PM    CRP <1.0 04/11/2025 03:02 PM      Results for orders placed or performed in visit on 04/11/25   CBC and differential   Result Value Ref Range    WBC 4.34 4.31 - 10.16 Thousand/uL    RBC 5.42 3.88 - 5.62 Million/uL    Hemoglobin 16.0 12.0 - 17.0 g/dL    Hematocrit 46.1 36.5 - 49.3 %    MCV 85 82 - 98 fL    MCH 29.5 26.8 - 34.3 pg    MCHC 34.7 31.4 - 37.4 g/dL    RDW 11.9 11.6 - 15.1 %    MPV 8.4 (L) 8.9 - 12.7 fL    Platelets 150 149 - 390 Thousands/uL    nRBC 0 /100 WBCs    Segmented % 56 43 - 75 %    Immature Grans % 0 0 - 2 %    Lymphocytes % 33 14 - 44 %    Monocytes % 9 4 - 12 %    Eosinophils Relative 1 0 - 6 %    Basophils Relative 1 0 - 1 %    Absolute Neutrophils 2.49 1.85 - 7.62 Thousands/µL    Absolute Immature Grans 0.01 0.00 - 0.20 Thousand/uL    Absolute Lymphocytes 1.42 0.60 - 4.47 Thousands/µL    Absolute Monocytes 0.37 0.17 - 1.22 Thousand/µL    Eosinophils Absolute 0.03 0.00 - 0.61 Thousand/µL    Basophils Absolute 0.02 0.00 - 0.10 Thousands/µL   Comprehensive metabolic panel   Result Value Ref Range    Sodium 139 135 - 147 mmol/L    Potassium 4.3 3.5 - 5.3 mmol/L    Chloride 102 96 - 108 mmol/L    CO2 31 21 - 32 mmol/L    ANION GAP 6 4 - 13 mmol/L    BUN 14 5 - 25 mg/dL    Creatinine 0.90 0.60 - 1.30 mg/dL    Glucose, Fasting 93 65 - 99 mg/dL    Calcium 9.2 8.4 - 10.2 mg/dL    AST 13 13 - 39 U/L    ALT 14 7 - 52 U/L    Alkaline Phosphatase 56 34 - 104 U/L    Total Protein 7.1 6.4 - 8.4 g/dL    Albumin 4.9 3.5 - 5.0 g/dL    Total Bilirubin 0.66 0.20 - 1.00 mg/dL    eGFR 104 ml/min/1.73sq m   Result Value Ref Range    Magnesium 1.9 1.9 - 2.7 mg/dL   LD,Blood   Result Value Ref Range     (L) 140 - 271 U/L   Result Value Ref Range    CRP <1.0 <3.0 mg/L   Sedimentation rate, automated   Result Value Ref Range    Sed Rate <1 0 - 14 mm/hour   Vitamin B12   Result Value Ref Range    Vitamin B-12 509 180 - 914  pg/mL   Result Value Ref Range    Folate 11.6 >5.9 ng/mL   TIBC Panel (incl. Iron, TIBC, % Iron Saturation)   Result Value Ref Range    Iron Saturation 28 15 - 50 %    TIBC 364 250 - 450 ug/dL    Iron 101 50 - 212 ug/dL    Transferrin 260 203 - 362 mg/dL    UIBC 263 155 - 355 ug/dL   Result Value Ref Range    Ferritin 102 30 - 336 ng/mL